# Patient Record
Sex: FEMALE | Race: WHITE
[De-identification: names, ages, dates, MRNs, and addresses within clinical notes are randomized per-mention and may not be internally consistent; named-entity substitution may affect disease eponyms.]

---

## 2018-08-13 ENCOUNTER — APPOINTMENT (OUTPATIENT)
Dept: ENDOCRINOLOGY | Facility: CLINIC | Age: 83
End: 2018-08-13

## 2024-08-14 ENCOUNTER — TRANSCRIPTION ENCOUNTER (OUTPATIENT)
Age: 89
End: 2024-08-14

## 2024-08-16 ENCOUNTER — TRANSCRIPTION ENCOUNTER (OUTPATIENT)
Age: 89
End: 2024-08-16

## 2024-10-30 ENCOUNTER — INPATIENT (INPATIENT)
Facility: HOSPITAL | Age: 89
LOS: 15 days | Discharge: HOME | DRG: 690 | End: 2024-11-15
Attending: HOSPITALIST | Admitting: HOSPITALIST
Payer: MEDICARE

## 2024-10-30 VITALS
WEIGHT: 89.95 LBS | OXYGEN SATURATION: 95 % | RESPIRATION RATE: 18 BRPM | DIASTOLIC BLOOD PRESSURE: 81 MMHG | HEIGHT: 62 IN | TEMPERATURE: 98 F | HEART RATE: 108 BPM | SYSTOLIC BLOOD PRESSURE: 130 MMHG

## 2024-10-30 LAB
ALBUMIN SERPL ELPH-MCNC: 3 G/DL — LOW (ref 3.3–5)
ALP SERPL-CCNC: 121 U/L — HIGH (ref 40–120)
ALT FLD-CCNC: 12 U/L — SIGNIFICANT CHANGE UP (ref 10–45)
ANION GAP SERPL CALC-SCNC: 8 MMOL/L — SIGNIFICANT CHANGE UP (ref 5–17)
ANISOCYTOSIS BLD QL: SLIGHT — SIGNIFICANT CHANGE UP
APPEARANCE UR: ABNORMAL
APTT BLD: 38.1 SEC — HIGH (ref 24.5–35.6)
AST SERPL-CCNC: 26 U/L — SIGNIFICANT CHANGE UP (ref 10–40)
BACTERIA # UR AUTO: ABNORMAL /HPF
BASOPHILS # BLD AUTO: 0.05 K/UL — SIGNIFICANT CHANGE UP (ref 0–0.2)
BASOPHILS NFR BLD AUTO: 1.8 % — SIGNIFICANT CHANGE UP (ref 0–2)
BILIRUB SERPL-MCNC: 0.3 MG/DL — SIGNIFICANT CHANGE UP (ref 0.2–1.2)
BILIRUB UR-MCNC: NEGATIVE — SIGNIFICANT CHANGE UP
BLD GP AB SCN SERPL QL: NEGATIVE — SIGNIFICANT CHANGE UP
BUN SERPL-MCNC: 32 MG/DL — HIGH (ref 7–23)
CALCIUM SERPL-MCNC: 8.6 MG/DL — SIGNIFICANT CHANGE UP (ref 8.4–10.5)
CHLORIDE SERPL-SCNC: 104 MMOL/L — SIGNIFICANT CHANGE UP (ref 96–108)
CO2 SERPL-SCNC: 22 MMOL/L — SIGNIFICANT CHANGE UP (ref 22–31)
COLOR SPEC: YELLOW — SIGNIFICANT CHANGE UP
CREAT SERPL-MCNC: 0.46 MG/DL — LOW (ref 0.5–1.3)
DIFF PNL FLD: ABNORMAL
EGFR: 91 ML/MIN/1.73M2 — SIGNIFICANT CHANGE UP
EOSINOPHIL # BLD AUTO: 0.08 K/UL — SIGNIFICANT CHANGE UP (ref 0–0.5)
EOSINOPHIL NFR BLD AUTO: 2.7 % — SIGNIFICANT CHANGE UP (ref 0–6)
FLUAV AG NPH QL: SIGNIFICANT CHANGE UP
FLUBV AG NPH QL: SIGNIFICANT CHANGE UP
GLUCOSE SERPL-MCNC: 84 MG/DL — SIGNIFICANT CHANGE UP (ref 70–99)
GLUCOSE UR QL: NEGATIVE MG/DL — SIGNIFICANT CHANGE UP
HCT VFR BLD CALC: 30.7 % — LOW (ref 34.5–45)
HGB BLD-MCNC: 9.9 G/DL — LOW (ref 11.5–15.5)
HYPOCHROMIA BLD QL: SLIGHT — SIGNIFICANT CHANGE UP
INR BLD: 2.59 RATIO — HIGH (ref 0.85–1.16)
KETONES UR-MCNC: NEGATIVE MG/DL — SIGNIFICANT CHANGE UP
LEUKOCYTE ESTERASE UR-ACNC: ABNORMAL
LIDOCAIN IGE QN: 47 U/L — SIGNIFICANT CHANGE UP (ref 7–60)
LYMPHOCYTES # BLD AUTO: 0.63 K/UL — LOW (ref 1–3.3)
LYMPHOCYTES # BLD AUTO: 21.2 % — SIGNIFICANT CHANGE UP (ref 13–44)
MANUAL SMEAR VERIFICATION: SIGNIFICANT CHANGE UP
MCHC RBC-ENTMCNC: 27.8 PG — SIGNIFICANT CHANGE UP (ref 27–34)
MCHC RBC-ENTMCNC: 32.2 G/DL — SIGNIFICANT CHANGE UP (ref 32–36)
MCV RBC AUTO: 86.2 FL — SIGNIFICANT CHANGE UP (ref 80–100)
MICROCYTES BLD QL: SLIGHT — SIGNIFICANT CHANGE UP
MONOCYTES # BLD AUTO: 0.32 K/UL — SIGNIFICANT CHANGE UP (ref 0–0.9)
MONOCYTES NFR BLD AUTO: 10.6 % — SIGNIFICANT CHANGE UP (ref 2–14)
NEUTROPHILS # BLD AUTO: 1.9 K/UL — SIGNIFICANT CHANGE UP (ref 1.8–7.4)
NEUTROPHILS NFR BLD AUTO: 63.7 % — SIGNIFICANT CHANGE UP (ref 43–77)
NITRITE UR-MCNC: NEGATIVE — SIGNIFICANT CHANGE UP
OVALOCYTES BLD QL SMEAR: SLIGHT — SIGNIFICANT CHANGE UP
PH UR: 7.5 — SIGNIFICANT CHANGE UP (ref 5–8)
PLAT MORPH BLD: NORMAL — SIGNIFICANT CHANGE UP
PLATELET # BLD AUTO: 329 K/UL — SIGNIFICANT CHANGE UP (ref 150–400)
POLYCHROMASIA BLD QL SMEAR: SLIGHT — SIGNIFICANT CHANGE UP
POTASSIUM SERPL-MCNC: 4.7 MMOL/L — SIGNIFICANT CHANGE UP (ref 3.5–5.3)
POTASSIUM SERPL-SCNC: 4.7 MMOL/L — SIGNIFICANT CHANGE UP (ref 3.5–5.3)
PROT SERPL-MCNC: 6.7 G/DL — SIGNIFICANT CHANGE UP (ref 6–8.3)
PROT UR-MCNC: 100 MG/DL
PROTHROM AB SERPL-ACNC: 29.2 SEC — HIGH (ref 9.9–13.4)
RBC # BLD: 3.56 M/UL — LOW (ref 3.8–5.2)
RBC # FLD: 16.4 % — HIGH (ref 10.3–14.5)
RBC BLD AUTO: ABNORMAL
RBC CASTS # UR COMP ASSIST: SIGNIFICANT CHANGE UP /HPF (ref 0–4)
RH IG SCN BLD-IMP: POSITIVE — SIGNIFICANT CHANGE UP
RSV RNA NPH QL NAA+NON-PROBE: SIGNIFICANT CHANGE UP
SARS-COV-2 RNA SPEC QL NAA+PROBE: SIGNIFICANT CHANGE UP
SODIUM SERPL-SCNC: 134 MMOL/L — LOW (ref 135–145)
SP GR SPEC: 1.01 — SIGNIFICANT CHANGE UP (ref 1–1.03)
TARGETS BLD QL SMEAR: SLIGHT — SIGNIFICANT CHANGE UP
UROBILINOGEN FLD QL: 1 MG/DL — SIGNIFICANT CHANGE UP (ref 0.2–1)
WBC # BLD: 2.99 K/UL — LOW (ref 3.8–10.5)
WBC # FLD AUTO: 2.99 K/UL — LOW (ref 3.8–10.5)
WBC UR QL: >50 /HPF — HIGH (ref 0–5)

## 2024-10-30 PROCEDURE — 71045 X-RAY EXAM CHEST 1 VIEW: CPT | Mod: 26

## 2024-10-30 PROCEDURE — 99285 EMERGENCY DEPT VISIT HI MDM: CPT | Mod: GC

## 2024-10-30 RX ORDER — SODIUM CHLORIDE 9 MG/ML
500 INJECTION, SOLUTION INTRAMUSCULAR; INTRAVENOUS; SUBCUTANEOUS ONCE
Refills: 0 | Status: COMPLETED | OUTPATIENT
Start: 2024-10-30 | End: 2024-10-30

## 2024-10-30 RX ADMIN — SODIUM CHLORIDE 500 MILLILITER(S): 9 INJECTION, SOLUTION INTRAMUSCULAR; INTRAVENOUS; SUBCUTANEOUS at 21:05

## 2024-10-30 RX ADMIN — SODIUM CHLORIDE 500 MILLILITER(S): 9 INJECTION, SOLUTION INTRAMUSCULAR; INTRAVENOUS; SUBCUTANEOUS at 18:25
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
## 2024-10-30 NOTE — ED ADULT TRIAGE NOTE - HEIGHT IN INCHES
09/14/21 1930   Provider Notification   Reason for Communication Evaluate;New orders; Review case   Provider Name Dr Cherelle Vasquez   Provider Notification Physician   Method of Communication Call   Response See orders   Notification Time 1931   called Dr Cherelle Vasquez at [id number] for admission orders and she said to release standing orders from transfer from 5th floor and to order Trazodone 50mg and Clonodine 0.1mg for elevated Bp greater that 160/100. Released admission orders and entered orders and administered medications to patient. Pt stated that his ears were ringing and pain in head, back and radiating to right foot from patient stated pinched nerve. Pain rating of 7 and admin prn Tylenol 650 mg for pain. Re-oriented patient to room and pt in da area and TV area, pt stated that he was tired and went to room. Pt was medication compliant and stated that he is tired. Pt Interm cough and increase urination. Will continue to monitor for safety. 2

## 2024-10-30 NOTE — ED ADULT NURSE REASSESSMENT NOTE - NS ED NURSE REASSESS COMMENT FT1
Pt bladder scan showed 63cc. MD ordered straight catheterization due to unsuccessful void. Pt and daughter at bedside  made aware of straight catheterization and both pt and daughter consented. Sterile technique was used accompanied by ROXY Peck. Pt bladder scan showed 63cc. Per MD Hinkle okay to straight catheterization Pt and daughter at bedside  made aware of straight catheterization and both pt and daughter consented. Sterile technique was used accompanied by ROXY Peck. 70 cc of urine drained. Pt cleaned and turned.
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
## 2024-10-30 NOTE — ED ADULT NURSE REASSESSMENT NOTE - NS ED NURSE REASSESS COMMENT FT1
Pt ordered for UA UC. pt unable to provide urine sample. straight cath ordered by MD. procedure explained to pt and pt daughter at bedside. Pt and pt daughter consent to straight cath, straight cath performed using sterile technique, with RN MD pollo DE ANDA kumichael. 0 cc urine drained, bladder scanned, 0cc seen on bladder scan. straight cath removed, MD made aware fluids ordered.

## 2024-10-30 NOTE — ED ADULT TRIAGE NOTE - CHIEF COMPLAINT QUOTE
[No studies available for review at this time.] : No studies available for review at this time. weight loss, ftt  diagnosed with vaginal mass this week
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
## 2024-10-30 NOTE — ED ADULT NURSE REASSESSMENT NOTE - NS ED NURSE REASSESS COMMENT FT1
Pt bladder scanned 28 cc seen. Md at bedside aware and can hold off on straight cath now.  more fluids are to be ordered and administer. Repeat bladder scan after fluids finish.

## 2024-10-30 NOTE — ED PROVIDER NOTE - PHYSICAL EXAMINATION
General: WN/WD NAD  Head: Atraumatic  Eyes: EOM grossly in tact, no scleral icterus  ENT: moist mucous membranes  Neurology: A&Ox3, nonfocal, MILTON x 4  Respiratory: normal respiratory effort  CV: Extremities warm and well perfused  Abdominal: Soft, non-distended  Extremities: No edema  : Shared decision making engaged with patient and family, okay with external visual inspection during catheter placement chapparoned with ARMAND Diop Only atrophic vaginitis noted, no bleeding or mass observed on external examination., no manual component performed at this time.  Skin: No rashes

## 2024-10-30 NOTE — ED PROVIDER NOTE - NSICDXPASTMEDICALHX_GEN_ALL_CORE_FT
PAST MEDICAL HISTORY:  Adult failure to thrive     Anemia of chronic disease     Benign essential HTN     Chronic atrial fibrillation     Hyperlipidemia     Severe malnutrition

## 2024-10-30 NOTE — ED ADULT NURSE NOTE - NSFALLRISKINTERV_ED_ALL_ED
Assistance OOB with selected safe patient handling equipment if applicable/Communicate fall risk and risk factors to all staff, patient, and family/Monitor gait and stability/Monitor for mental status changes and reorient to person, place, and time, as needed/Move patient closer to nursing station/within visual sight of ED staff/Provide patient with walking aids/Provide visual cue: yellow wristband, yellow gown, etc/Reinforce activity limits and safety measures with patient and family/Toileting schedule using arm’s reach rule for commode and bathroom/Use of alarms - bed, stretcher, chair and/or video monitoring/Call bell, personal items and telephone in reach/Instruct patient to call for assistance before getting out of bed/chair/stretcher/Non-slip footwear applied when patient is off stretcher/[place] to call system/Physically safe environment - no spills, clutter or unnecessary equipment/Purposeful Proactive Rounding/Room/bathroom lighting operational, light cord in reach

## 2024-10-31 DIAGNOSIS — N39.0 URINARY TRACT INFECTION, SITE NOT SPECIFIED: ICD-10-CM

## 2024-10-31 LAB
ALBUMIN SERPL ELPH-MCNC: 3 G/DL — LOW (ref 3.3–5)
ALP SERPL-CCNC: 118 U/L — SIGNIFICANT CHANGE UP (ref 40–120)
ALT FLD-CCNC: 11 U/L — SIGNIFICANT CHANGE UP (ref 10–45)
ANION GAP SERPL CALC-SCNC: 10 MMOL/L — SIGNIFICANT CHANGE UP (ref 5–17)
AST SERPL-CCNC: 17 U/L — SIGNIFICANT CHANGE UP (ref 10–40)
BILIRUB SERPL-MCNC: 0.3 MG/DL — SIGNIFICANT CHANGE UP (ref 0.2–1.2)
BUN SERPL-MCNC: 22 MG/DL — SIGNIFICANT CHANGE UP (ref 7–23)
CALCIUM SERPL-MCNC: 8.9 MG/DL — SIGNIFICANT CHANGE UP (ref 8.4–10.5)
CHLORIDE SERPL-SCNC: 101 MMOL/L — SIGNIFICANT CHANGE UP (ref 96–108)
CO2 SERPL-SCNC: 24 MMOL/L — SIGNIFICANT CHANGE UP (ref 22–31)
CREAT SERPL-MCNC: 0.4 MG/DL — LOW (ref 0.5–1.3)
EGFR: 95 ML/MIN/1.73M2 — SIGNIFICANT CHANGE UP
GLUCOSE SERPL-MCNC: 92 MG/DL — SIGNIFICANT CHANGE UP (ref 70–99)
INR BLD: 1.97 RATIO — HIGH (ref 0.85–1.16)
MAGNESIUM SERPL-MCNC: 2.1 MG/DL — SIGNIFICANT CHANGE UP (ref 1.6–2.6)
PHOSPHATE SERPL-MCNC: 3.1 MG/DL — SIGNIFICANT CHANGE UP (ref 2.5–4.5)
POTASSIUM SERPL-MCNC: 3.7 MMOL/L — SIGNIFICANT CHANGE UP (ref 3.5–5.3)
POTASSIUM SERPL-SCNC: 3.7 MMOL/L — SIGNIFICANT CHANGE UP (ref 3.5–5.3)
PROT SERPL-MCNC: 6.6 G/DL — SIGNIFICANT CHANGE UP (ref 6–8.3)
PROTHROM AB SERPL-ACNC: 22.5 SEC — HIGH (ref 9.9–13.4)
SODIUM SERPL-SCNC: 135 MMOL/L — SIGNIFICANT CHANGE UP (ref 135–145)

## 2024-10-31 PROCEDURE — 74177 CT ABD & PELVIS W/CONTRAST: CPT | Mod: 26,MC

## 2024-10-31 PROCEDURE — 70450 CT HEAD/BRAIN W/O DYE: CPT | Mod: 26,MC

## 2024-10-31 RX ORDER — LEVOTHYROXINE SODIUM 88 MCG
25 TABLET ORAL DAILY
Refills: 0 | Status: DISCONTINUED | OUTPATIENT
Start: 2024-10-31 | End: 2024-11-15

## 2024-10-31 RX ORDER — MELATONIN 5 MG
3 TABLET ORAL AT BEDTIME
Refills: 0 | Status: DISCONTINUED | OUTPATIENT
Start: 2024-10-31 | End: 2024-11-15

## 2024-10-31 RX ORDER — CEFTRIAXONE SODIUM 10 G
1000 VIAL (EA) INJECTION EVERY 24 HOURS
Refills: 0 | Status: COMPLETED | OUTPATIENT
Start: 2024-10-31 | End: 2024-11-03

## 2024-10-31 RX ORDER — OXYCODONE HYDROCHLORIDE 30 MG/1
2.5 TABLET ORAL EVERY 6 HOURS
Refills: 0 | Status: DISCONTINUED | OUTPATIENT
Start: 2024-10-31 | End: 2024-10-31

## 2024-10-31 RX ORDER — PANTOPRAZOLE SODIUM 40 MG/1
40 TABLET, DELAYED RELEASE ORAL
Refills: 0 | Status: DISCONTINUED | OUTPATIENT
Start: 2024-10-31 | End: 2024-11-15

## 2024-10-31 RX ORDER — WARFARIN SODIUM 4 MG
3 TABLET ORAL AT BEDTIME
Refills: 0 | Status: COMPLETED | OUTPATIENT
Start: 2024-10-31 | End: 2024-10-31

## 2024-10-31 RX ORDER — CEFTRIAXONE SODIUM 10 G
1000 VIAL (EA) INJECTION ONCE
Refills: 0 | Status: COMPLETED | OUTPATIENT
Start: 2024-10-31 | End: 2024-10-31

## 2024-10-31 RX ORDER — DULOXETINE HYDROCHLORIDE 30 MG/1
30 CAPSULE, DELAYED RELEASE ORAL DAILY
Refills: 0 | Status: DISCONTINUED | OUTPATIENT
Start: 2024-10-31 | End: 2024-11-15

## 2024-10-31 RX ADMIN — Medication 100 MILLIGRAM(S): at 01:35

## 2024-10-31 RX ADMIN — Medication 3 MILLIGRAM(S): at 21:51

## 2024-10-31 RX ADMIN — Medication 3 MILLIGRAM(S): at 21:50

## 2024-10-31 NOTE — PATIENT PROFILE ADULT - FUNCTIONAL ASSESSMENT - BASIC MOBILITY 6.
1-calculated by average/Not able to assess (calculate score using [facility] averaging method)

## 2024-10-31 NOTE — PATIENT PROFILE ADULT - FALL HARM RISK - RISK INTERVENTIONS
07-Jul-2021 03:21
Assistance OOB with selected safe patient handling equipment/Assistance with ambulation/Communicate Fall Risk and Risk Factors to all staff, patient, and family/Discuss with provider need for PT consult/Monitor gait and stability/Provide patient with walking aids - walker, cane, crutches/Reinforce activity limits and safety measures with patient and family/Visual Cue: Yellow wristband/Bed in lowest position, wheels locked, appropriate side rails in place/Call bell, personal items and telephone in reach/Instruct patient to call for assistance before getting out of bed or chair/Non-slip footwear when patient is out of bed/[place] to call system/Physically safe environment - no spills, clutter or unnecessary equipment/Purposeful Proactive Rounding/Room/bathroom lighting operational, light cord in reach

## 2024-10-31 NOTE — H&P ADULT - NSHPPHYSICALEXAM_GEN_ALL_CORE
PHYSICAL EXAM    Constitutional: NAD, malnourished   HEENT: PERRLA, EOMI, Normal Hearing, MMM  Neck: No LAD, No JVD  Back: Normal spine flexure, No CVA tenderness  Respiratory: CTAB/L   Cardiovascular: S1 and S2, RRR, no M/G/R  Gastrointestinal: BS+, soft, NT/ND  Extremities: No peripheral edema  Vascular: 2+ peripheral pulses  Neurological: A/O x 3, no focal deficits  Skin: No rashes PHYSICAL EXAM    Constitutional: NAD, malnourished   HEENT: PERRLA,  Neck: No LAD, No JVD  Respiratory: CTAB/L   Cardiovascular: S1 and S2, RRR, no M/G/R  Extremities: No peripheral edema  Vascular: 2+ peripheral pulses  Neurological: A/O x 3, no focal deficits  Skin: No rashes
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
## 2024-10-31 NOTE — PATIENT PROFILE ADULT - FALL HARM RISK - HARM RISK INTERVENTIONS
Assistance with ambulation/Assistance OOB with selected safe patient handling equipment/Communicate Risk of Fall with Harm to all staff/Discuss with provider need for PT consult/Monitor gait and stability/Reinforce activity limits and safety measures with patient and family/Tailored Fall Risk Interventions/Visual Cue: Yellow wristband and red socks/Bed in lowest position, wheels locked, appropriate side rails in place/Call bell, personal items and telephone in reach/Instruct patient to call for assistance before getting out of bed or chair/Non-slip footwear when patient is out of bed/[place] to call system/Physically safe environment - no spills, clutter or unnecessary equipment/Purposeful Proactive Rounding/Room/bathroom lighting operational, light cord in reach
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
## 2024-11-01 LAB
ANION GAP SERPL CALC-SCNC: 12 MMOL/L — SIGNIFICANT CHANGE UP (ref 5–17)
BUN SERPL-MCNC: 25 MG/DL — HIGH (ref 7–23)
CALCIUM SERPL-MCNC: 8.4 MG/DL — SIGNIFICANT CHANGE UP (ref 8.4–10.5)
CHLORIDE SERPL-SCNC: 103 MMOL/L — SIGNIFICANT CHANGE UP (ref 96–108)
CO2 SERPL-SCNC: 22 MMOL/L — SIGNIFICANT CHANGE UP (ref 22–31)
CREAT SERPL-MCNC: 0.55 MG/DL — SIGNIFICANT CHANGE UP (ref 0.5–1.3)
CULTURE RESULTS: SIGNIFICANT CHANGE UP
EGFR: 88 ML/MIN/1.73M2 — SIGNIFICANT CHANGE UP
GLUCOSE SERPL-MCNC: 79 MG/DL — SIGNIFICANT CHANGE UP (ref 70–99)
HCT VFR BLD CALC: 32 % — LOW (ref 34.5–45)
HGB BLD-MCNC: 10.2 G/DL — LOW (ref 11.5–15.5)
INR BLD: 1.71 RATIO — HIGH (ref 0.85–1.16)
MCHC RBC-ENTMCNC: 26.6 PG — LOW (ref 27–34)
MCHC RBC-ENTMCNC: 31.9 G/DL — LOW (ref 32–36)
MCV RBC AUTO: 83.6 FL — SIGNIFICANT CHANGE UP (ref 80–100)
NRBC # BLD: 0 /100 WBCS — SIGNIFICANT CHANGE UP (ref 0–0)
PLATELET # BLD AUTO: 378 K/UL — SIGNIFICANT CHANGE UP (ref 150–400)
POTASSIUM SERPL-MCNC: 3.6 MMOL/L — SIGNIFICANT CHANGE UP (ref 3.5–5.3)
POTASSIUM SERPL-SCNC: 3.6 MMOL/L — SIGNIFICANT CHANGE UP (ref 3.5–5.3)
PROTHROM AB SERPL-ACNC: 19.6 SEC — HIGH (ref 9.9–13.4)
RBC # BLD: 3.83 M/UL — SIGNIFICANT CHANGE UP (ref 3.8–5.2)
RBC # FLD: 16.5 % — HIGH (ref 10.3–14.5)
SODIUM SERPL-SCNC: 137 MMOL/L — SIGNIFICANT CHANGE UP (ref 135–145)
SPECIMEN SOURCE: SIGNIFICANT CHANGE UP
WBC # BLD: 3.46 K/UL — LOW (ref 3.8–10.5)
WBC # FLD AUTO: 3.46 K/UL — LOW (ref 3.8–10.5)

## 2024-11-01 RX ORDER — WARFARIN SODIUM 4 MG
3 TABLET ORAL AT BEDTIME
Refills: 0 | Status: COMPLETED | OUTPATIENT
Start: 2024-11-01 | End: 2024-11-01

## 2024-11-01 RX ADMIN — DULOXETINE HYDROCHLORIDE 30 MILLIGRAM(S): 30 CAPSULE, DELAYED RELEASE ORAL at 14:57

## 2024-11-01 RX ADMIN — Medication 25 MICROGRAM(S): at 05:41

## 2024-11-01 RX ADMIN — Medication 3 MILLIGRAM(S): at 22:48

## 2024-11-01 RX ADMIN — Medication 100 MILLIGRAM(S): at 01:45

## 2024-11-01 RX ADMIN — PANTOPRAZOLE SODIUM 40 MILLIGRAM(S): 40 TABLET, DELAYED RELEASE ORAL at 05:41

## 2024-11-01 NOTE — DIETITIAN INITIAL EVALUATION ADULT - ENERGY INTAKE
Pt reports she feels better today and therefore able to eat. Very happy with the hospital foods. RN set up tray for Pt and then she was able to feed herself. Observed breakfast tray during interview, Pt consumed ~50% and was still eating. Per RN flowsheets, Pt consuming 50-75% of meals. Pt states her daughter is involved in her care and ordered her meals for her and also brings in outside "favorites." Did not wish to elaborate or provide food preferences. Pt states she likes Ensures and confirmed she was receiving them plus the LPS (orange) at the SNF and is amenable to receiving supplements while in the hospital. Labs reviewed. Fair (50-75%)
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
## 2024-11-01 NOTE — DIETITIAN INITIAL EVALUATION ADULT - PERTINENT LABORATORY DATA
11-01    137  |  103  |  25[H]  ----------------------------<  79  3.6   |  22  |  0.55    Ca    8.4      01 Nov 2024 07:07  Phos  3.1     10-31  Mg     2.1     10-31    TPro  6.6  /  Alb  3.0[L]  /  TBili  0.3  /  DBili  x   /  AST  17  /  ALT  11  /  AlkPhos  118  10-31
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
## 2024-11-01 NOTE — DIETITIAN INITIAL EVALUATION ADULT - ORAL INTAKE PTA/DIET HISTORY
Chart reviewed, events noted. Pt reports fluctuating PO intake based on mood. Denies issues chewing/swallowing. NKFA. Per SNF paperwork, Pt was on a regular diet with thin liquids with a nutritional supplement 3x/day, LPS 3x/day, enriched cereal 3x/day, and protein pudding 3x/day. Per review of previous RD note, Pt with prolonged poor PO intake PTA for months.

## 2024-11-01 NOTE — DIETITIAN INITIAL EVALUATION ADULT - EDUCATION DIETARY MODIFICATIONS
adequate protein/calorie intake of diet and supplements/(1) partially meets; needs review/practice/verbalization

## 2024-11-01 NOTE — DIETITIAN INITIAL EVALUATION ADULT - OTHER INFO
dosing wt: 40.8 kg (10/30)  [facility] HIE wt hx: 42 kg (8/4)  reported UBW: unknown; previously reported as 54.4 kg unknown time frame; Pt states she was never "heavy"  ht/wt per SNF paperwork: 68" / 38 kg  reported ht: 67", now 65"? ; dosing ht likely incorrect

## 2024-11-01 NOTE — DIETITIAN INITIAL EVALUATION ADULT - ADD RECOMMEND
1) liberalize diet to regular given age/FTT/BMI  2) recommend ensure plus high protein 2x/day + LPS 3x/day to supplement diet  3) recommend MVI and vit C for wound healing  4) Malnutrition sticker placed, RD to follow-up as per protocol   5) Monitor PO intake, weight, labs, skin, GI status, diet

## 2024-11-01 NOTE — DIETITIAN INITIAL EVALUATION ADULT - REASON INDICATOR FOR ASSESSMENT
nutrition consults received for assessment/education; MST 2 or > (unsure wt loss and eating poorly)  BMI < 19

## 2024-11-01 NOTE — DIETITIAN INITIAL EVALUATION ADULT - PERTINENT MEDS FT
MEDICATIONS  (STANDING):  cefTRIAXone   IVPB 1000 milliGRAM(s) IV Intermittent every 24 hours  DULoxetine 30 milliGRAM(s) Oral daily  levothyroxine 25 MICROGram(s) Oral daily  melatonin 3 milliGRAM(s) Oral at bedtime  pantoprazole    Tablet 40 milliGRAM(s) Oral before breakfast    MEDICATIONS  (PRN):  oxyCODONE    IR 2.5 milliGRAM(s) Oral every 6 hours PRN Severe Pain (7 - 10)

## 2024-11-02 LAB
ANION GAP SERPL CALC-SCNC: 10 MMOL/L — SIGNIFICANT CHANGE UP (ref 5–17)
BUN SERPL-MCNC: 32 MG/DL — HIGH (ref 7–23)
CALCIUM SERPL-MCNC: 8.2 MG/DL — LOW (ref 8.4–10.5)
CHLORIDE SERPL-SCNC: 102 MMOL/L — SIGNIFICANT CHANGE UP (ref 96–108)
CO2 SERPL-SCNC: 25 MMOL/L — SIGNIFICANT CHANGE UP (ref 22–31)
CREAT SERPL-MCNC: 0.55 MG/DL — SIGNIFICANT CHANGE UP (ref 0.5–1.3)
EGFR: 88 ML/MIN/1.73M2 — SIGNIFICANT CHANGE UP
GLUCOSE SERPL-MCNC: 79 MG/DL — SIGNIFICANT CHANGE UP (ref 70–99)
HCT VFR BLD CALC: 29.2 % — LOW (ref 34.5–45)
HGB BLD-MCNC: 9.2 G/DL — LOW (ref 11.5–15.5)
INR BLD: 2.04 RATIO — HIGH (ref 0.85–1.16)
MAGNESIUM SERPL-MCNC: 2 MG/DL — SIGNIFICANT CHANGE UP (ref 1.6–2.6)
MCHC RBC-ENTMCNC: 26.5 PG — LOW (ref 27–34)
MCHC RBC-ENTMCNC: 31.5 G/DL — LOW (ref 32–36)
MCV RBC AUTO: 84.1 FL — SIGNIFICANT CHANGE UP (ref 80–100)
NRBC # BLD: 0 /100 WBCS — SIGNIFICANT CHANGE UP (ref 0–0)
PHOSPHATE SERPL-MCNC: 3.4 MG/DL — SIGNIFICANT CHANGE UP (ref 2.5–4.5)
PLATELET # BLD AUTO: 342 K/UL — SIGNIFICANT CHANGE UP (ref 150–400)
POTASSIUM SERPL-MCNC: 3.7 MMOL/L — SIGNIFICANT CHANGE UP (ref 3.5–5.3)
POTASSIUM SERPL-SCNC: 3.7 MMOL/L — SIGNIFICANT CHANGE UP (ref 3.5–5.3)
PROTHROM AB SERPL-ACNC: 23.1 SEC — HIGH (ref 9.9–13.4)
RBC # BLD: 3.47 M/UL — LOW (ref 3.8–5.2)
RBC # FLD: 16.4 % — HIGH (ref 10.3–14.5)
SODIUM SERPL-SCNC: 137 MMOL/L — SIGNIFICANT CHANGE UP (ref 135–145)
WBC # BLD: 2.99 K/UL — LOW (ref 3.8–10.5)
WBC # FLD AUTO: 2.99 K/UL — LOW (ref 3.8–10.5)

## 2024-11-02 RX ORDER — WARFARIN SODIUM 4 MG
3 TABLET ORAL AT BEDTIME
Refills: 0 | Status: DISCONTINUED | OUTPATIENT
Start: 2024-11-02 | End: 2024-11-05

## 2024-11-02 RX ADMIN — Medication 100 MILLIGRAM(S): at 00:47

## 2024-11-02 RX ADMIN — DULOXETINE HYDROCHLORIDE 30 MILLIGRAM(S): 30 CAPSULE, DELAYED RELEASE ORAL at 12:44

## 2024-11-02 RX ADMIN — Medication 25 MICROGRAM(S): at 05:22

## 2024-11-02 RX ADMIN — Medication 3 MILLIGRAM(S): at 21:41

## 2024-11-02 RX ADMIN — PANTOPRAZOLE SODIUM 40 MILLIGRAM(S): 40 TABLET, DELAYED RELEASE ORAL at 06:57

## 2024-11-02 RX ADMIN — Medication 3 MILLIGRAM(S): at 21:43

## 2024-11-03 LAB
ANION GAP SERPL CALC-SCNC: 12 MMOL/L — SIGNIFICANT CHANGE UP (ref 5–17)
BUN SERPL-MCNC: 31 MG/DL — HIGH (ref 7–23)
CALCIUM SERPL-MCNC: 8.7 MG/DL — SIGNIFICANT CHANGE UP (ref 8.4–10.5)
CHLORIDE SERPL-SCNC: 101 MMOL/L — SIGNIFICANT CHANGE UP (ref 96–108)
CO2 SERPL-SCNC: 22 MMOL/L — SIGNIFICANT CHANGE UP (ref 22–31)
CREAT SERPL-MCNC: 0.5 MG/DL — SIGNIFICANT CHANGE UP (ref 0.5–1.3)
EGFR: 90 ML/MIN/1.73M2 — SIGNIFICANT CHANGE UP
GLUCOSE SERPL-MCNC: 68 MG/DL — LOW (ref 70–99)
HCT VFR BLD CALC: 31 % — LOW (ref 34.5–45)
HGB BLD-MCNC: 9.8 G/DL — LOW (ref 11.5–15.5)
INR BLD: 1.98 RATIO — HIGH (ref 0.85–1.16)
MAGNESIUM SERPL-MCNC: 2 MG/DL — SIGNIFICANT CHANGE UP (ref 1.6–2.6)
MCHC RBC-ENTMCNC: 26.9 PG — LOW (ref 27–34)
MCHC RBC-ENTMCNC: 31.6 G/DL — LOW (ref 32–36)
MCV RBC AUTO: 85.2 FL — SIGNIFICANT CHANGE UP (ref 80–100)
NRBC # BLD: 0 /100 WBCS — SIGNIFICANT CHANGE UP (ref 0–0)
PHOSPHATE SERPL-MCNC: 3.1 MG/DL — SIGNIFICANT CHANGE UP (ref 2.5–4.5)
PLATELET # BLD AUTO: 348 K/UL — SIGNIFICANT CHANGE UP (ref 150–400)
POTASSIUM SERPL-MCNC: 4.1 MMOL/L — SIGNIFICANT CHANGE UP (ref 3.5–5.3)
POTASSIUM SERPL-SCNC: 4.1 MMOL/L — SIGNIFICANT CHANGE UP (ref 3.5–5.3)
PROTHROM AB SERPL-ACNC: 22.4 SEC — HIGH (ref 9.9–13.4)
RBC # BLD: 3.64 M/UL — LOW (ref 3.8–5.2)
RBC # FLD: 16.2 % — HIGH (ref 10.3–14.5)
SODIUM SERPL-SCNC: 135 MMOL/L — SIGNIFICANT CHANGE UP (ref 135–145)
WBC # BLD: 3.09 K/UL — LOW (ref 3.8–10.5)
WBC # FLD AUTO: 3.09 K/UL — LOW (ref 3.8–10.5)

## 2024-11-03 RX ORDER — LISINOPRIL 40 MG
5 TABLET ORAL DAILY
Refills: 0 | Status: DISCONTINUED | OUTPATIENT
Start: 2024-11-03 | End: 2024-11-15

## 2024-11-03 RX ADMIN — Medication 3 MILLIGRAM(S): at 21:55

## 2024-11-03 RX ADMIN — Medication 25 MICROGRAM(S): at 05:13

## 2024-11-03 RX ADMIN — DULOXETINE HYDROCHLORIDE 30 MILLIGRAM(S): 30 CAPSULE, DELAYED RELEASE ORAL at 09:13

## 2024-11-03 RX ADMIN — Medication 5 MILLIGRAM(S): at 09:13

## 2024-11-03 RX ADMIN — PANTOPRAZOLE SODIUM 40 MILLIGRAM(S): 40 TABLET, DELAYED RELEASE ORAL at 05:14

## 2024-11-03 RX ADMIN — Medication 100 MILLIGRAM(S): at 00:30

## 2024-11-04 LAB
INR BLD: 1.79 RATIO — HIGH (ref 0.85–1.16)
PROTHROM AB SERPL-ACNC: 20.3 SEC — HIGH (ref 9.9–13.4)

## 2024-11-04 RX ADMIN — PANTOPRAZOLE SODIUM 40 MILLIGRAM(S): 40 TABLET, DELAYED RELEASE ORAL at 06:36

## 2024-11-04 RX ADMIN — Medication 25 MICROGRAM(S): at 05:30

## 2024-11-04 RX ADMIN — DULOXETINE HYDROCHLORIDE 30 MILLIGRAM(S): 30 CAPSULE, DELAYED RELEASE ORAL at 13:17

## 2024-11-04 RX ADMIN — Medication 5 MILLIGRAM(S): at 05:30

## 2024-11-04 RX ADMIN — Medication 3 MILLIGRAM(S): at 21:04

## 2024-11-05 LAB
APTT BLD: 34.8 SEC — SIGNIFICANT CHANGE UP (ref 24.5–35.6)
INR BLD: 1.72 RATIO — HIGH (ref 0.85–1.16)
PROTHROM AB SERPL-ACNC: 19.5 SEC — HIGH (ref 9.9–13.4)

## 2024-11-05 RX ORDER — WARFARIN SODIUM 4 MG
4 TABLET ORAL ONCE
Refills: 0 | Status: COMPLETED | OUTPATIENT
Start: 2024-11-05 | End: 2024-11-05

## 2024-11-05 RX ORDER — SODIUM CHLORIDE 9 MG/ML
1000 INJECTION, SOLUTION INTRAMUSCULAR; INTRAVENOUS; SUBCUTANEOUS
Refills: 0 | Status: DISCONTINUED | OUTPATIENT
Start: 2024-11-05 | End: 2024-11-08

## 2024-11-05 RX ADMIN — SODIUM CHLORIDE 50 MILLILITER(S): 9 INJECTION, SOLUTION INTRAMUSCULAR; INTRAVENOUS; SUBCUTANEOUS at 15:57

## 2024-11-05 RX ADMIN — PANTOPRAZOLE SODIUM 40 MILLIGRAM(S): 40 TABLET, DELAYED RELEASE ORAL at 05:19

## 2024-11-05 RX ADMIN — Medication 25 MICROGRAM(S): at 05:19

## 2024-11-05 RX ADMIN — SODIUM CHLORIDE 50 MILLILITER(S): 9 INJECTION, SOLUTION INTRAMUSCULAR; INTRAVENOUS; SUBCUTANEOUS at 15:49

## 2024-11-05 RX ADMIN — Medication 4 MILLIGRAM(S): at 21:38

## 2024-11-05 RX ADMIN — Medication 5 MILLIGRAM(S): at 05:18

## 2024-11-05 RX ADMIN — Medication 3 MILLIGRAM(S): at 21:38

## 2024-11-05 RX ADMIN — DULOXETINE HYDROCHLORIDE 30 MILLIGRAM(S): 30 CAPSULE, DELAYED RELEASE ORAL at 12:15

## 2024-11-05 NOTE — PROGRESS NOTE ADULT - CONVERSATION DETAILS
Discussed with  daughter  regarding advanced care planning  for at least 30 minutes. Reviewed MOLST form and decided to keep it as is and agreed for urology consult.

## 2024-11-05 NOTE — CONSULT NOTE ADULT - ASSESSMENT
89F PMHx FTT, afib, HLD, anemia, muscle wasting and atrophy, admitted from facility. CT scan showing mild right hydronephrosis, nonobstructing bilateral renal calculi, and dependent bladder calculi within diverticula. Urology called for evaluation.    Plan:  - No urgent intervention is indicated at this time as patient is afebrile, asymptomatic and stable  - Patient with stone burden in bladder and kidney - 1.4cm renal pelvic stone could be intermittently obstructive causing right hydronephrosis  - Would elect for outpatient surgery with cystoscopy, possible right percutaneous nephrolithotomy (high risk) vs staged ureteroscopy however patient has a lot of social issues getting to appointments. Alternatively, could opt for drainage of the right kidney as a long term solution with a right NT (would require routine exchanges under sedation every 4-6 months)  - Furthermore, patient is DNR/DNI and would require GOC about an elective surgery as the patient herself said she would prefer to do nothing at this time  - Please call urology back once GOC has been complete and if patient would like to proceed with surgery   - Call urology if patients clinical status changes/spikes any fevers     Discussed with Dr. Cano  [facility]  [address] [place] [address], [address]  [place] [address] [phone]   89F PMHx FTT, afib, HLD, anemia, muscle wasting and atrophy, admitted from facility. CT scan showing mild right hydronephrosis, nonobstructing bilateral renal calculi, and dependent bladder calculi within diverticula. Urology called for evaluation.    Plan:  - No urgent intervention is indicated at this time as patient is afebrile, asymptomatic and stable  - Patient with stone burden in bladder and kidney - 1.4cm renal pelvic stone could be intermittently obstructive causing right hydronephrosis  - Would elect for outpatient surgery with cystoscopy, possible right percutaneous nephrolithotomy (high risk) vs staged ureteroscopy (multiple rounds of anesthesia) however patient has a lot of social issues getting to appointments. Alternatively, could opt for drainage of the right kidney as a long term solution with a right NT (would require routine exchanges under sedation every 4-6 months)  - Furthermore, patient is DNR/DNI and would require GOC about an elective surgery as the patient herself said she would prefer to do nothing at this time  - Please call urology back once GOC has been complete and if patient would like to proceed with surgery   - Call urology if patients clinical status changes/spikes any fevers     Discussed with Dr. Cano  [facility]  [address] [place] [address], [address]  [place] [address] [phone]

## 2024-11-05 NOTE — CONSULT NOTE ADULT - SUBJECTIVE AND OBJECTIVE BOX
HPI:  89F PMHx FTT, afib, HLD, anemia, muscle wasting and atrophy, admitted from facility. CT scan showing mild right hydronephrosis, nonobstructing bilateral renal calculi, and dependent bladder calculi within diverticula. Urology called for evaluation.  Patient seen and examined at bedside with daughter. Per daughter, pt admitted with UTI in august and with hx of OAB but does not f/u with urology outpatient.   Patient admits to intermittent dysuria. Denies fever/chills, chest pain, abdominal pain, N/V back pain.     PAST MEDICAL & SURGICAL HISTORY:  Adult failure to thrive  Chronic atrial fibrillation  Severe malnutrition  Benign essential HTN  Hyperlipidemia  Anemia of chronic disease    MEDICATIONS  (STANDING):  DULoxetine 30 milliGRAM(s) Oral daily  levothyroxine 25 MICROGram(s) Oral daily  lisinopril 5 milliGRAM(s) Oral daily  melatonin 3 milliGRAM(s) Oral at bedtime  pantoprazole    Tablet 40 milliGRAM(s) Oral before breakfast  sodium chloride 0.9%. 1000 milliLiter(s) (50 mL/Hr) IV Continuous <Continuous>  warfarin 4 milliGRAM(s) Oral once    MEDICATIONS  (PRN):  oxyCODONE    IR 2.5 milliGRAM(s) Oral every 6 hours PRN Severe Pain (7 - 10)    Allergies  No Known Allergies    REVIEW OF SYSTEMS: Pertinent positives and negatives as stated in HPI, otherwise negative    Vital signs  T(C): 36.7 (11-05-24 @ 16:10), Max: 36.7 (11-05-24 @ 16:10)  HR: 78 (11-05-24 @ 16:10)  BP: 101/66 (11-05-24 @ 16:10)  SpO2: 98% (11-05-24 @ 16:10)    Physical Exam  Gen: NAD, thin appearing   HEENT: NCAT  Pulm: nonlabored respirations   CV: appears well perfused  Abd: Soft, NT, ND   : no SP tenderness  MSK:  No CVAT  SKIN: warm, dry, no rash.    LABS:  PT/INR - ( 05 Nov 2024 07:27 )   PT: 19.5 sec;   INR: 1.72 ratio      PTT - ( 05 Nov 2024 07:27 )  PTT:34.8 sec      Urine Cx:   Blood Cx:    Radiology:  < from: CT Abdomen and Pelvis w/ Oral Cont and w/ IV Cont (10.31.24 @ 01:02) >  IMPRESSION:  Acute inflammatory changes on chronic bladderfindings as described   likely indicates cystitis superimposed on neurogenic bladder versus   sequela of chronic bladder outlet obstruction. Correlate with urinalysis    Bilateral renal collecting system and ureteral findings as described most   likely ascending tract infection given acute cystitis    Mild right hydronephrosis. Nonobstructing bilateral renal calculi.   Dependent bladder calculi within diverticula    < end of copied text >

## 2024-11-06 LAB
ANION GAP SERPL CALC-SCNC: 9 MMOL/L — SIGNIFICANT CHANGE UP (ref 5–17)
APTT BLD: 33.8 SEC — SIGNIFICANT CHANGE UP (ref 24.5–35.6)
BUN SERPL-MCNC: 23 MG/DL — SIGNIFICANT CHANGE UP (ref 7–23)
CALCIUM SERPL-MCNC: 8.4 MG/DL — SIGNIFICANT CHANGE UP (ref 8.4–10.5)
CHLORIDE SERPL-SCNC: 99 MMOL/L — SIGNIFICANT CHANGE UP (ref 96–108)
CO2 SERPL-SCNC: 23 MMOL/L — SIGNIFICANT CHANGE UP (ref 22–31)
CREAT SERPL-MCNC: 0.46 MG/DL — LOW (ref 0.5–1.3)
EGFR: 91 ML/MIN/1.73M2 — SIGNIFICANT CHANGE UP
GLUCOSE SERPL-MCNC: 70 MG/DL — SIGNIFICANT CHANGE UP (ref 70–99)
HCT VFR BLD CALC: 30.1 % — LOW (ref 34.5–45)
HGB BLD-MCNC: 9.6 G/DL — LOW (ref 11.5–15.5)
INR BLD: 1.84 RATIO — HIGH (ref 0.85–1.16)
MCHC RBC-ENTMCNC: 27 PG — SIGNIFICANT CHANGE UP (ref 27–34)
MCHC RBC-ENTMCNC: 31.9 G/DL — LOW (ref 32–36)
MCV RBC AUTO: 84.6 FL — SIGNIFICANT CHANGE UP (ref 80–100)
NRBC # BLD: 0 /100 WBCS — SIGNIFICANT CHANGE UP (ref 0–0)
PLATELET # BLD AUTO: 316 K/UL — SIGNIFICANT CHANGE UP (ref 150–400)
POTASSIUM SERPL-MCNC: 4.4 MMOL/L — SIGNIFICANT CHANGE UP (ref 3.5–5.3)
POTASSIUM SERPL-SCNC: 4.4 MMOL/L — SIGNIFICANT CHANGE UP (ref 3.5–5.3)
PREALB SERPL-MCNC: 11 MG/DL — LOW (ref 20–40)
PROTHROM AB SERPL-ACNC: 20.8 SEC — HIGH (ref 9.9–13.4)
RBC # BLD: 3.56 M/UL — LOW (ref 3.8–5.2)
RBC # FLD: 16.1 % — HIGH (ref 10.3–14.5)
SODIUM SERPL-SCNC: 131 MMOL/L — LOW (ref 135–145)
TSH SERPL-MCNC: 3.23 UIU/ML — SIGNIFICANT CHANGE UP (ref 0.27–4.2)
WBC # BLD: 3.28 K/UL — LOW (ref 3.8–10.5)
WBC # FLD AUTO: 3.28 K/UL — LOW (ref 3.8–10.5)

## 2024-11-06 RX ORDER — WARFARIN SODIUM 4 MG
4 TABLET ORAL ONCE
Refills: 0 | Status: COMPLETED | OUTPATIENT
Start: 2024-11-06 | End: 2024-11-06

## 2024-11-06 RX ADMIN — DULOXETINE HYDROCHLORIDE 30 MILLIGRAM(S): 30 CAPSULE, DELAYED RELEASE ORAL at 12:59

## 2024-11-06 RX ADMIN — PANTOPRAZOLE SODIUM 40 MILLIGRAM(S): 40 TABLET, DELAYED RELEASE ORAL at 05:29

## 2024-11-06 RX ADMIN — Medication 4 MILLIGRAM(S): at 21:16

## 2024-11-06 RX ADMIN — Medication 25 MICROGRAM(S): at 05:29

## 2024-11-06 RX ADMIN — Medication 5 MILLIGRAM(S): at 05:30

## 2024-11-06 RX ADMIN — Medication 3 MILLIGRAM(S): at 21:16

## 2024-11-06 NOTE — PHYSICAL THERAPY INITIAL EVALUATION ADULT - PERTINENT HX OF CURRENT PROBLEM, REHAB EVAL
89 yo F with PMH of  Advanced debility, adult FTT,  Muscle wasting and atrophy, chronic A-Fib, Severe protein-calorie malnutrition, Underweight (BMI < 19), HTN. HLD, Hypothyroidism, Anemia of poor PO intake, at risk of malnutrition, constipation, unsteady gait. who was found with a vaginal mass and some delusional thought which could be due to UTI was transferred to ER for further urology evaluation. She claimed in ER she was examined by a male nurse for this vaginal mass without chaperone however based on our investigation from [facility], the exam was done with patient's consent and chaperoned with one of female aid, all documented in facility notes. She was found with a vaginal mass and per her daughter request she was sent to hospital. CXR 10/30: Trace left pleural effusion or atelectasis. CTH 10/31: No acute intracranial hemorrhage, mass effect, or midline shift. CT ABDOME/PELVIS 10/31: Acute inflammatory changes on chronic bladderfindings as described likely indicates cystitis superimposed on neurogenic bladder versus sequela of chronic bladder outlet obstruction. Bilateral renal collecting system and ureteral findings as described most likely ascending tract infection given acute cystitis. Mild right hydronephrosis. Nonobstructing bilateral renal calculi. Dependent bladder calculi within diverticula
89 yo F with PMH of  Advanced debility, adult FTT,  Muscle wasting and atrophy, chronic A-Fib, Severe protein-calorie malnutrition, Underweight (BMI < 19), HTN. HLD, Hypothyroidism, Anemia of poor PO intake, at risk of malnutrition, constipation, unsteady gait. who was found with a vaginal mass and some delusional thought which could be due to UTI was transferred to ER for further urology evaluation. She claimed in ER she was examined by a male nurse for this vaginal mass without chaperone however based on our investigation from [facility], the exam was done with patient's consent and chaperoned with one of female aid, all documented in facility notes. She was found with a vaginal mass and per her daughter request she was sent to hospital. Pt confirmed she had vaginal exam by male nurse who asked for permission first and also there was chaperone during examination by female aide and she confirmed this story. XRAY CHEST (10/30): Trace left pleural effusion or atelectasis. CT HEAD (10/31): No acute intracranial hemorrhage, mass effect, or midline shift. If clinical symptoms persist or worsen, more sensitive evaluation with brain MRI may be obtained, if no contraindications exist. CT ABD and PEL (10/31): Acute inflammatory changes on chronic bladder findings as described likely indicates cystitis superimposed on neurogenic bladder versus sequela of chronic bladder outlet obstruction. Correlate with urinalysis. Bilateral renal collecting system and ureteral findings as described most   likely ascending tract infection given acute cystitis. Mild right hydronephrosis. Non obstructing bilateral renal calculi. Dependent bladder calculi within diverticula.

## 2024-11-06 NOTE — CHART NOTE - NSCHARTNOTEFT_GEN_A_CORE
Spoke with primary team.  Consult discontinued.  Please reconsult if necessary.    Sobeida Sahu, ANP-BC  Please contact me via Teams  between 6am-2pm. If not answering, please call the palliative care pager [phone]    After 2pm and on weekends, please see the contact information below:    In the event of newly developing, evolving, or worsening symptoms between 2pm and 5pm please contact the Palliative Medicine via extension [phone] for assistance.  After 5pm please contact team via pager (if the patient is at [facility] #[phone] or if the patient is at [facility] #[phone]) The Geriatric and Palliative Medicine service has coverage 24 hours a day/ 7 days a week to provide medical recommendations regarding symptom management needs via telephone

## 2024-11-06 NOTE — PHYSICAL THERAPY INITIAL EVALUATION ADULT - ADDITIONAL COMMENTS
Pt reports she lives in a house, unclear of current living status due to prolonged stay in rehab. Pt reports difficulty with maintaining the house and states her daughter will be in charge of the house moving forward. Pt reports needing assistance with all ADLs and requires a mechanical lift to transfer out of bed at the rehab center.

## 2024-11-07 DIAGNOSIS — I10 ESSENTIAL (PRIMARY) HYPERTENSION: ICD-10-CM

## 2024-11-07 DIAGNOSIS — R62.7 ADULT FAILURE TO THRIVE: ICD-10-CM

## 2024-11-07 DIAGNOSIS — E78.5 HYPERLIPIDEMIA, UNSPECIFIED: ICD-10-CM

## 2024-11-07 DIAGNOSIS — I48.20 CHRONIC ATRIAL FIBRILLATION, UNSPECIFIED: ICD-10-CM

## 2024-11-07 LAB
ANION GAP SERPL CALC-SCNC: 11 MMOL/L — SIGNIFICANT CHANGE UP (ref 5–17)
BUN SERPL-MCNC: 21 MG/DL — SIGNIFICANT CHANGE UP (ref 7–23)
CALCIUM SERPL-MCNC: 8.3 MG/DL — LOW (ref 8.4–10.5)
CHLORIDE SERPL-SCNC: 99 MMOL/L — SIGNIFICANT CHANGE UP (ref 96–108)
CO2 SERPL-SCNC: 23 MMOL/L — SIGNIFICANT CHANGE UP (ref 22–31)
CREAT SERPL-MCNC: 0.45 MG/DL — LOW (ref 0.5–1.3)
EGFR: 92 ML/MIN/1.73M2 — SIGNIFICANT CHANGE UP
GLUCOSE SERPL-MCNC: 72 MG/DL — SIGNIFICANT CHANGE UP (ref 70–99)
HCT VFR BLD CALC: 30.5 % — LOW (ref 34.5–45)
HGB BLD-MCNC: 9.7 G/DL — LOW (ref 11.5–15.5)
INR BLD: 1.76 RATIO — HIGH (ref 0.85–1.16)
MCHC RBC-ENTMCNC: 26.4 PG — LOW (ref 27–34)
MCHC RBC-ENTMCNC: 31.8 G/DL — LOW (ref 32–36)
MCV RBC AUTO: 83.1 FL — SIGNIFICANT CHANGE UP (ref 80–100)
NRBC # BLD: 0 /100 WBCS — SIGNIFICANT CHANGE UP (ref 0–0)
PLATELET # BLD AUTO: 309 K/UL — SIGNIFICANT CHANGE UP (ref 150–400)
POTASSIUM SERPL-MCNC: 4 MMOL/L — SIGNIFICANT CHANGE UP (ref 3.5–5.3)
POTASSIUM SERPL-SCNC: 4 MMOL/L — SIGNIFICANT CHANGE UP (ref 3.5–5.3)
PROTHROM AB SERPL-ACNC: 19.9 SEC — HIGH (ref 9.9–13.4)
RBC # BLD: 3.67 M/UL — LOW (ref 3.8–5.2)
RBC # FLD: 16.2 % — HIGH (ref 10.3–14.5)
SODIUM SERPL-SCNC: 133 MMOL/L — LOW (ref 135–145)
WBC # BLD: 2.76 K/UL — LOW (ref 3.8–10.5)
WBC # FLD AUTO: 2.76 K/UL — LOW (ref 3.8–10.5)

## 2024-11-07 RX ORDER — WARFARIN SODIUM 4 MG
5 TABLET ORAL ONCE
Refills: 0 | Status: COMPLETED | OUTPATIENT
Start: 2024-11-07 | End: 2024-11-07

## 2024-11-07 RX ORDER — ENOXAPARIN SODIUM 40MG/0.4ML
40 SYRINGE (ML) SUBCUTANEOUS EVERY 12 HOURS
Refills: 0 | Status: DISCONTINUED | OUTPATIENT
Start: 2024-11-07 | End: 2024-11-07

## 2024-11-07 RX ADMIN — Medication 5 MILLIGRAM(S): at 21:39

## 2024-11-07 RX ADMIN — Medication 3 MILLIGRAM(S): at 21:39

## 2024-11-07 RX ADMIN — Medication 5 MILLIGRAM(S): at 05:19

## 2024-11-07 RX ADMIN — Medication 25 MICROGRAM(S): at 05:18

## 2024-11-07 RX ADMIN — PANTOPRAZOLE SODIUM 40 MILLIGRAM(S): 40 TABLET, DELAYED RELEASE ORAL at 05:19

## 2024-11-07 NOTE — CONSULT NOTE ADULT - ASSESSMENT
89 yo F with PMH of  Advanced debility, adult FTT,  Muscle wasting and atrophy, chronic A-Fib, Severe protein-calorie malnutrition, Underweight (BMI < 19), HTN. HLD, Hypothyroidism, Anemia of poor PO intake, at risk of malnutrition, constipation, unsteady gait. who was found with a vaginal mass and some delusional thought which could be due to UTI was transferred to ER for further urology evaluation.     Her coumadin was briefly stopped for 3 days for preparation of a urological procedure. However, when it was resumed, it was difficult to achieve therapeutic INR>

## 2024-11-07 NOTE — CONSULT NOTE ADULT - SUBJECTIVE AND OBJECTIVE BOX
HPI:  89 yo F with PMH of  Advanced debility, adult FTT,  Muscle wasting and atrophy, chronic A-Fib, Severe protein-calorie malnutrition, Underweight (BMI < 19), HTN. HLD, Hypothyroidism, Anemia of poor PO intake, at risk of malnutrition, constipation, unsteady gait. who was found with a vaginal mass and some delusional thought which could be due to UTI was transferred to ER for further urology evaluation. She claimed in ER she was examined by a male nurse for this vaginal mass without chaperone however based on our investigation from [facility], the exam was done with patient's consent and chaperoned with one of female aid, all documented in facility notes. She was found with a vaginal mass and per her daughter request she was sent to hospital.     I spoke with the patient as well myself and she confirmed she had vaginal exam by male nurse who asked for permission first and also there was chaperone during examination by female aide and she  confirmed this story, I saw her today with charge nurse of the floor in [address]. She refused any genital examination   (31 Oct 2024 07:58)    REVIEW OF SYSTEMS:    CONSTITUTIONAL: No weakness, fevers or chills, weight loss  EYES/ENT: No visual changes, no throat pain   RESPIRATORY: No cough, wheezing, hemoptysis; No shortness of breath  CARDIOVASCULAR: No chest pain or palpitations  GASTROINTESTINAL: No abdominal, nausea, vomiting, or hematemesis; No diarrhea or constipation. No melena or hematochezia.  GENITOURINARY: No dysuria, frequency or hematuria  NEUROLOGICAL: No dizziness, numbness, or weakness  SKIN: No itching, burning, rashes, or lesions   All other review of systems is negative unless indicated above.    VITAL SIGNS:        PHYSICAL EXAM:     GENERAL: no acute distress  HEENT: NC/AT, EOMI, neck supple, MMM  RESPIRATORY: LCTAB/L, no rhonchi, rales, or wheezing  CARDIOVASCULAR: RRR, no murmurs, gallops, rubs  ABDOMINAL: soft, non-tender, non-distended, positive bowel sounds   EXTREMITIES: no clubbing, cyanosis, or edema  NEUROLOGICAL: alert and oriented x 3, non-focal  LYMPHATIC: lymphatic: cervical, supraclavicular, axilla, inguinal  SKIN: no rashes or lesions   MUSCULOSKELETAL: no gross joint deformity                          9.7    2.76  )-----------( 309      ( 07 Nov 2024 07:41 )             30.5     11-07    133[L]  |  99  |  21  ----------------------------<  72  4.0   |  23  |  0.45[L]    Ca    8.3[L]      07 Nov 2024 07:41        MEDICATIONS  (STANDING):  DULoxetine 30 milliGRAM(s) Oral daily  enoxaparin Injectable 40 milliGRAM(s) SubCutaneous every 12 hours  levothyroxine 25 MICROGram(s) Oral daily  lisinopril 5 milliGRAM(s) Oral daily  melatonin 3 milliGRAM(s) Oral at bedtime  pantoprazole    Tablet 40 milliGRAM(s) Oral before breakfast  sodium chloride 0.9%. 1000 milliLiter(s) (50 mL/Hr) IV Continuous <Continuous>  warfarin 5 milliGRAM(s) Oral once       HPI:  88 yo F with PMH of  Advanced debility, adult FTT,  Muscle wasting and atrophy, chronic A-Fib, Severe protein-calorie malnutrition, Underweight (BMI < 19), HTN. HLD, Hypothyroidism, Anemia of poor PO intake, at risk of malnutrition, constipation, unsteady gait. who was found with a vaginal mass and some delusional thought which could be due to UTI was transferred to ER for further urology evaluation. She claimed in ER she was examined by a male nurse for this vaginal mass without chaperone however based on our investigation from [facility], the exam was done with patient's consent and chaperoned with one of female aid, all documented in facility notes. She was found with a vaginal mass and per her daughter request she was sent to hospital.     Her coumadin was briefly stopped for 3 days for preparation of a urological procedure. However, when it was resumed, it was difficult to achieve therapeutic INR>      REVIEW OF SYSTEMS:    CONSTITUTIONAL: some weakness, no fevers or chills, weight loss  EYES/ENT: No visual changes, no throat pain   RESPIRATORY: No cough, wheezing, hemoptysis; No shortness of breath  CARDIOVASCULAR: No chest pain or palpitations  GASTROINTESTINAL: No abdominal, nausea, vomiting, or hematemesis; No diarrhea or constipation. No melena or hematochezia.  GENITOURINARY: No dysuria, frequency or hematuria  NEUROLOGICAL: No dizziness, numbness, or weakness  SKIN: No itching, burning, rashes, or lesions       Allergies and Intolerances:        Allergies:  	No Known Allergies:     Home Medications:   * Patient Currently Takes Medications as of 16-Aug-2024 11:00 documented in Structured Notes  · 	warfarin 3 mg oral tablet: 1 tab(s) orally once a day (at bedtime) Continue to Monitor INR (Goal 2-3)  	INR 8/16/24: 1.89  · 	Narcan 4 mg/0.1 mL nasal spray: 1 spray(s) intranasally prn use in the event of suspected opioid overdose  · 	pantoprazole 40 mg oral delayed release tablet: 1 tab(s) orally once a day (before a meal)  · 	ALPRAZolam 0.25 mg oral tablet: 1 tab(s) orally every 8 hours as needed for anxiety TAKE ONE TABLET EVERY 8 HOURS AS NEEDED FOR ANXIETY AND AT BEDTIME  · 	DULoxetine 30 mg oral delayed release capsule: 1 cap(s) orally once a day  · 	oxyCODONE 5 mg/5 mL oral solution: 2.5 milliliter(s) orally every 6 hours as needed for Moderate Pain (4 - 6) TAKE 2.5 MILLILITERS EVERY SIX HOURS AS NEEDED FOR MODERATE PAIN  · 	acetaminophen 325 mg oral tablet: 2 tab(s) orally every 6 hours  · 	melatonin 3 mg oral tablet: 1 tab(s) orally once a day (at bedtime)  · 	ascorbic acid 500 mg oral tablet: 1 tab(s) orally once a day  · 	Multiple Vitamins oral tablet: 1 tab(s) orally once a day  · 	levothyroxine 25 mcg (0.025 mg) oral tablet: 1 tab(s) orally once a day  · 	senna leaf extract oral tablet: 2 tab(s) orally once a day (at bedtime)  · 	polyethylene glycol 3350 oral powder for reconstitution: 17 gram(s) orally 2 times a day  · 	lidocaine 4% topical film: Apply topically to affected area once a day    .      PAST MEDICAL HISTORY:  Adult failure to thrive     Anemia of chronic disease     Benign essential HTN     Chronic atrial fibrillation     Hyperlipidemia     Severe malnutrition.     Social History:  · Substance use	No  · Social History (marital status, living situation, occupation, and sexual history)	Alochol: Denied  Smoking: Nonsmoker  Drug Use: Denied  Marital Status:      VITAL SIGNS:    T98.7 P80 /63 RR18    PHYSICAL EXAM:     GENERAL: no acute distress,thin,  HEENT: NC/AT, EOMI, neck supple, MMM  RESPIRATORY: LCTAB/L, no rhonchi, rales, or wheezing  CARDIOVASCULAR: RRR, no murmurs, gallops, rubs  ABDOMINAL: soft, non-tender, non-distended, positive bowel sounds   EXTREMITIES: no clubbing, cyanosis, or edema  NEUROLOGICAL: alert and oriented x 3, non-focal  LYMPHATIC: lymphatic: cervical, supraclavicular, axilla, inguinal  SKIN: no rashes or lesions   MUSCULOSKELETAL: muscle wasting                          9.7    2.76  )-----------( 309      ( 07 Nov 2024 07:41 )             30.5     11-07    133[L]  |  99  |  21  ----------------------------<  72  4.0   |  23  |  0.45[L]    Ca    8.3[L]      07 Nov 2024 07:41        MEDICATIONS  (STANDING):  DULoxetine 30 milliGRAM(s) Oral daily  enoxaparin Injectable 40 milliGRAM(s) SubCutaneous every 12 hours  levothyroxine 25 MICROGram(s) Oral daily  lisinopril 5 milliGRAM(s) Oral daily  melatonin 3 milliGRAM(s) Oral at bedtime  pantoprazole    Tablet 40 milliGRAM(s) Oral before breakfast  sodium chloride 0.9%. 1000 milliLiter(s) (50 mL/Hr) IV Continuous <Continuous>  warfarin 5 milliGRAM(s) Oral once

## 2024-11-07 NOTE — CONSULT NOTE ADULT - PROBLEM SELECTOR RECOMMENDATION 9
continue to monitor INR  restrict or control  the amount green vegetable (rich in vitamin K) can eat per day  coumadin 5 mg po tonight

## 2024-11-08 LAB
INR BLD: 2.08 RATIO — HIGH (ref 0.85–1.16)
PROTHROM AB SERPL-ACNC: 23.5 SEC — HIGH (ref 9.9–13.4)

## 2024-11-08 RX ORDER — WARFARIN SODIUM 4 MG
4 TABLET ORAL ONCE
Refills: 0 | Status: COMPLETED | OUTPATIENT
Start: 2024-11-08 | End: 2024-11-08

## 2024-11-08 RX ADMIN — Medication 25 MICROGRAM(S): at 05:17

## 2024-11-08 RX ADMIN — Medication 5 MILLIGRAM(S): at 05:17

## 2024-11-08 RX ADMIN — DULOXETINE HYDROCHLORIDE 30 MILLIGRAM(S): 30 CAPSULE, DELAYED RELEASE ORAL at 11:47

## 2024-11-08 RX ADMIN — Medication 3 MILLIGRAM(S): at 21:07

## 2024-11-08 RX ADMIN — Medication 4 MILLIGRAM(S): at 21:07

## 2024-11-08 RX ADMIN — PANTOPRAZOLE SODIUM 40 MILLIGRAM(S): 40 TABLET, DELAYED RELEASE ORAL at 05:17

## 2024-11-08 NOTE — PROGRESS NOTE PEDS - SUBJECTIVE AND OBJECTIVE BOX
No complaints  Achieved therapeutic INR    REVIEW OF SYSTEMS:    CONSTITUTIONAL: No weakness, fevers or chills, weight loss  EYES/ENT: No visual changes, no throat pain   RESPIRATORY: No cough, wheezing, hemoptysis; No shortness of breath  CARDIOVASCULAR: No chest pain or palpitations  GASTROINTESTINAL: No abdominal, nausea, vomiting, or hematemesis; No diarrhea or constipation. No melena or hematochezia.      VITAL SIGNS:    T97.8    PHYSICAL EXAM:     GENERAL: no acute distress  HEENT: NC/AT, EOMI, neck supple, MMM  RESPIRATORY: LCTAB/L, no rhonchi, rales, or wheezing  CARDIOVASCULAR: RRR, no murmurs, gallops, rubs  ABDOMINAL: soft, non-tender, non-distended, positive bowel sounds   EXTREMITIES: no clubbing, cyanosis, or edema  NEUROLOGICAL: alert and oriented x 3, non-focal                        9.7    2.76  )-----------( 309      ( 07 Nov 2024 07:41 )             30.5     11-07    133[L]  |  99  |  21  ----------------------------<  72  4.0   |  23  |  0.45[L]    Ca    8.3[L]      07 Nov 2024 07:41        MEDICATIONS  (STANDING):  DULoxetine 30 milliGRAM(s) Oral daily  levothyroxine 25 MICROGram(s) Oral daily  lisinopril 5 milliGRAM(s) Oral daily  melatonin 3 milliGRAM(s) Oral at bedtime  pantoprazole    Tablet 40 milliGRAM(s) Oral before breakfast  warfarin 4 milliGRAM(s) Oral once

## 2024-11-09 LAB
INR BLD: 2.17 RATIO — HIGH (ref 0.85–1.16)
PROTHROM AB SERPL-ACNC: 24.5 SEC — HIGH (ref 9.9–13.4)

## 2024-11-09 RX ORDER — WARFARIN SODIUM 4 MG
5 TABLET ORAL ONCE
Refills: 0 | Status: COMPLETED | OUTPATIENT
Start: 2024-11-09 | End: 2024-11-09

## 2024-11-09 RX ADMIN — Medication 25 MICROGRAM(S): at 04:58

## 2024-11-09 RX ADMIN — Medication 5 MILLIGRAM(S): at 04:58

## 2024-11-09 RX ADMIN — DULOXETINE HYDROCHLORIDE 30 MILLIGRAM(S): 30 CAPSULE, DELAYED RELEASE ORAL at 11:08

## 2024-11-09 RX ADMIN — Medication 3 MILLIGRAM(S): at 21:58

## 2024-11-09 RX ADMIN — Medication 5 MILLIGRAM(S): at 21:58

## 2024-11-09 RX ADMIN — PANTOPRAZOLE SODIUM 40 MILLIGRAM(S): 40 TABLET, DELAYED RELEASE ORAL at 04:58

## 2024-11-10 LAB
INR BLD: 2.55 RATIO — HIGH (ref 0.85–1.16)
PROTHROM AB SERPL-ACNC: 28.8 SEC — HIGH (ref 9.9–13.4)

## 2024-11-10 RX ORDER — WARFARIN SODIUM 4 MG
4 TABLET ORAL ONCE
Refills: 0 | Status: COMPLETED | OUTPATIENT
Start: 2024-11-10 | End: 2024-11-10

## 2024-11-10 RX ADMIN — Medication 3 MILLIGRAM(S): at 21:32

## 2024-11-10 RX ADMIN — PANTOPRAZOLE SODIUM 40 MILLIGRAM(S): 40 TABLET, DELAYED RELEASE ORAL at 05:08

## 2024-11-10 RX ADMIN — Medication 5 MILLIGRAM(S): at 05:07

## 2024-11-10 RX ADMIN — Medication 4 MILLIGRAM(S): at 21:33

## 2024-11-10 RX ADMIN — Medication 25 MICROGRAM(S): at 05:07

## 2024-11-10 RX ADMIN — DULOXETINE HYDROCHLORIDE 30 MILLIGRAM(S): 30 CAPSULE, DELAYED RELEASE ORAL at 11:48

## 2024-11-11 ENCOUNTER — TRANSCRIPTION ENCOUNTER (OUTPATIENT)
Age: 89
End: 2024-11-11

## 2024-11-11 LAB
INR BLD: 2.79 RATIO — HIGH (ref 0.85–1.16)
PROTHROM AB SERPL-ACNC: 31.8 SEC — HIGH (ref 9.9–13.4)

## 2024-11-11 RX ORDER — SENNA 187 MG
2 TABLET ORAL
Qty: 0 | Refills: 0 | DISCHARGE
Start: 2024-11-11

## 2024-11-11 RX ORDER — POLYETHYLENE GLYCOL 3350 17 G/17G
17 POWDER, FOR SOLUTION ORAL DAILY
Refills: 0 | Status: DISCONTINUED | OUTPATIENT
Start: 2024-11-11 | End: 2024-11-12

## 2024-11-11 RX ORDER — LEVOTHYROXINE SODIUM 88 MCG
1 TABLET ORAL
Qty: 0 | Refills: 0 | DISCHARGE
Start: 2024-11-11

## 2024-11-11 RX ORDER — MELATONIN 5 MG
1 TABLET ORAL
Qty: 0 | Refills: 0 | DISCHARGE
Start: 2024-11-11

## 2024-11-11 RX ORDER — LISINOPRIL 40 MG
1 TABLET ORAL
Qty: 0 | Refills: 0 | DISCHARGE
Start: 2024-11-11

## 2024-11-11 RX ORDER — WARFARIN SODIUM 4 MG
1 TABLET ORAL
Qty: 1 | Refills: 0
Start: 2024-11-11 | End: 2024-11-11

## 2024-11-11 RX ORDER — DULOXETINE HYDROCHLORIDE 30 MG/1
1 CAPSULE, DELAYED RELEASE ORAL
Qty: 0 | Refills: 0 | DISCHARGE
Start: 2024-11-11

## 2024-11-11 RX ORDER — SENNA 187 MG
2 TABLET ORAL AT BEDTIME
Refills: 0 | Status: DISCONTINUED | OUTPATIENT
Start: 2024-11-11 | End: 2024-11-15

## 2024-11-11 RX ORDER — PANTOPRAZOLE SODIUM 40 MG/1
1 TABLET, DELAYED RELEASE ORAL
Qty: 0 | Refills: 0 | DISCHARGE
Start: 2024-11-11

## 2024-11-11 RX ADMIN — Medication 3 MILLIGRAM(S): at 22:41

## 2024-11-11 RX ADMIN — DULOXETINE HYDROCHLORIDE 30 MILLIGRAM(S): 30 CAPSULE, DELAYED RELEASE ORAL at 11:28

## 2024-11-11 RX ADMIN — Medication 2 TABLET(S): at 02:55

## 2024-11-11 RX ADMIN — Medication 25 MICROGRAM(S): at 05:33

## 2024-11-11 RX ADMIN — PANTOPRAZOLE SODIUM 40 MILLIGRAM(S): 40 TABLET, DELAYED RELEASE ORAL at 05:34

## 2024-11-11 RX ADMIN — Medication 5 MILLIGRAM(S): at 05:34

## 2024-11-11 NOTE — DISCHARGE NOTE PROVIDER - NSDCFUADDAPPT_GEN_ALL_CORE_FT
APPTS ARE READY TO BE MADE: [x ] YES    Best Family or Patient Contact (if needed):    Additional Information about above appointments (if needed):    1: pcp  2: urology  3:     Other comments or requests:    APPTS ARE READY TO BE MADE: [x ] YES    Best Family or Patient Contact (if needed):    Additional Information about above appointments (if needed):    1: pcp  2: urology  3:     Other comments or requests:     Patient was outreached but did not answer. A voicemail was left for the patient to return our call.

## 2024-11-11 NOTE — DISCHARGE NOTE PROVIDER - NSDCCPCAREPLAN_GEN_ALL_CORE_FT
PRINCIPAL DISCHARGE DIAGNOSIS  Diagnosis: Urinary tract infection  Assessment and Plan of Treatment: You completed IV antibiotics for UTI. please f/u with urology      SECONDARY DISCHARGE DIAGNOSES  Diagnosis: Vaginal mass  Assessment and Plan of Treatment: Would elect for outpatient surgery with cystoscopy, possible right percutaneous nephrolithotomy (high risk) vs staged ureteroscopy (multiple rounds of anesthesia). Follow up with urology        PRINCIPAL DISCHARGE DIAGNOSIS  Diagnosis: Urinary tract infection  Assessment and Plan of Treatment: You completed IV antibiotics for UTI.   Please seek medical care if symptoms return, develop fever, pain on urination, etc.      SECONDARY DISCHARGE DIAGNOSES  Diagnosis: Vaginal mass  Assessment and Plan of Treatment: Would elect for outpatient surgery with cystoscopy, possible right percutaneous nephrolithotomy (high risk) vs staged ureteroscopy (multiple rounds of anesthesia). Follow up with urology to discuss plan and options

## 2024-11-11 NOTE — DISCHARGE NOTE PROVIDER - HOSPITAL COURSE
HPI:  89 yo F with PMH of  Advanced debility, adult FTT,  Muscle wasting and atrophy, chronic A-Fib, Severe protein-calorie malnutrition, Underweight (BMI < 19), HTN. HLD, Hypothyroidism, Anemia of poor PO intake, at risk of malnutrition, constipation, unsteady gait. who was found with a vaginal mass and some delusional thought which could be due to UTI was transferred to ER for further urology evaluation. She claimed in ER she was examined by a male nurse for this vaginal mass without chaperone however based on our investigation from [facility], the exam was done with patient's consent and chaperoned with one of female aid, all documented in facility notes. She was found with a vaginal mass and per her daughter request she was sent to hospital.     I spoke with the patient as well myself and she confirmed she had vaginal exam by male nurse who asked for permission first and also there was chaperone during examination by female aide and she  confirmed this story, I saw her today with charge nurse of the floor in [facility]. She refused any genital examination   (31 Oct 2024 07:58)    Hospital Course:  Patient was admitted for UTI severe cystitis , UC contaminated treat with 5 days IV ceftriaxone. Vaginal mass -Daughter agreed for urology consult.  Bladder wall thickening -possible malignancy with a lot of diverticulum and stone in bladder. Evaluated by urology who recommended f/u office as outpatient.   AFIB - Coumadin. INR therapeutic. Hypothyroid - on Levothroid 25 mcg, monitor TSH. Depression - on Duloxetine    Important Medication Changes and Reason:  lisinopril dose increase  Active or Pending Issues Requiring Follow-up:  Vaginal mass - f/u urology consult outpatient     Advanced Directives:   [x ] Full code  [ ] DNR  [ ] Hospice    Discharge Diagnoses:  UTI  AFIB   VAGINAL MASS  DEPRESSION  Hypothyroid

## 2024-11-11 NOTE — DISCHARGE NOTE PROVIDER - NSDCMRMEDTOKEN_GEN_ALL_CORE_FT
acetaminophen 325 mg oral tablet: 2 tab(s) orally every 6 hours  ascorbic acid 500 mg oral tablet: 1 tab(s) orally once a day  DULoxetine 30 mg oral delayed release capsule: 1 cap(s) orally once a day  levothyroxine 25 mcg (0.025 mg) oral tablet: 1 tab(s) orally once a day  lisinopril 5 mg oral tablet: 1 tab(s) orally once a day  melatonin 3 mg oral tablet: 1 tab(s) orally once a day (at bedtime)  Multiple Vitamins oral tablet: 1 tab(s) orally once a day  pantoprazole 40 mg oral delayed release tablet: 1 tab(s) orally once a day (before a meal)  polyethylene glycol 3350 oral powder for reconstitution: 17 gram(s) orally 2 times a day  senna leaf extract oral tablet: 2 tab(s) orally once a day (at bedtime) As needed Constipation  warfarin 3 mg oral tablet: 1 tab(s) orally once a day (at bedtime) Continue to Monitor INR (Goal 2-3)   acetaminophen 325 mg oral tablet: 2 tab(s) orally every 6 hours  ascorbic acid 500 mg oral tablet: 1 tab(s) orally once a day  DULoxetine 30 mg oral delayed release capsule: 1 cap(s) orally once a day  levothyroxine 25 mcg (0.025 mg) oral tablet: 1 tab(s) orally once a day  lidocaine 4% topical film: Apply topically to affected area once a day as needed for  back pain  lisinopril 5 mg oral tablet: 1 tab(s) orally once a day  melatonin 3 mg oral tablet: 1 tab(s) orally once a day (at bedtime)  Multiple Vitamins oral tablet: 1 tab(s) orally once a day  pantoprazole 40 mg oral delayed release tablet: 1 tab(s) orally once a day (before a meal)  polyethylene glycol 3350 oral powder for reconstitution: 17 gram(s) orally 2 times a day  senna leaf extract oral tablet: 2 tab(s) orally once a day (at bedtime) as needed for Constipation  warfarin 4 mg oral tablet: 1 tab(s) orally once a day (at bedtime)

## 2024-11-11 NOTE — DISCHARGE NOTE PROVIDER - PROVIDER TOKENS
PROVIDER:[TOKEN:[[id number]],FOLLOWUP:[2 weeks]] PROVIDER:[TOKEN:[[id number]],FOLLOWUP:[2 weeks]],PROVIDER:[TOKEN:[[id number]],FOLLOWUP:[2 weeks]]

## 2024-11-11 NOTE — DISCHARGE NOTE PROVIDER - DETAILS OF MALNUTRITION DIAGNOSIS/DIAGNOSES
This patient has been assessed with a concern for Malnutrition and was treated during this hospitalization for the following Nutrition diagnosis/diagnoses:     -  11/01/2024: Severe protein-calorie malnutrition   -  11/01/2024: Underweight (BMI < 19)

## 2024-11-11 NOTE — DISCHARGE NOTE PROVIDER - CARE PROVIDER_API CALL
Chandrika Batres  Internal Medicine  [address] [place] [id number]  Phone: [phone]  Fax: [phone]  Follow Up Time: 2 weeks   Chandrika Batres  Internal Medicine  [address] [place] [address]  Phone: [phone]  Fax: [phone]  Follow Up Time: 2 weeks    Zay Cano  Urology  [address] [place] [id number]  Phone: [phone]  Fax: [phone]  Follow Up Time: 2 weeks

## 2024-11-11 NOTE — DISCHARGE NOTE PROVIDER - CARE PROVIDERS DIRECT ADDRESSES
,DirectAddress_Unknown ,DirectAddress_Unknown,[email or web address]

## 2024-11-12 LAB
INR BLD: 2.58 RATIO — HIGH (ref 0.85–1.16)
PROTHROM AB SERPL-ACNC: 29.4 SEC — HIGH (ref 9.9–13.4)

## 2024-11-12 RX ORDER — WARFARIN SODIUM 4 MG
4 TABLET ORAL ONCE
Refills: 0 | Status: COMPLETED | OUTPATIENT
Start: 2024-11-12 | End: 2024-11-12

## 2024-11-12 RX ORDER — TUBERCULIN,PURIF.PROT.DERIV. 5 T/0.1 ML
5 VIAL (ML) INTRADERMAL ONCE
Refills: 0 | Status: COMPLETED | OUTPATIENT
Start: 2024-11-12 | End: 2024-11-12

## 2024-11-12 RX ADMIN — Medication 25 MICROGRAM(S): at 05:43

## 2024-11-12 RX ADMIN — Medication 4 MILLIGRAM(S): at 22:51

## 2024-11-12 RX ADMIN — Medication 3 MILLIGRAM(S): at 22:51

## 2024-11-12 RX ADMIN — Medication 5 MILLIGRAM(S): at 05:43

## 2024-11-12 RX ADMIN — DULOXETINE HYDROCHLORIDE 30 MILLIGRAM(S): 30 CAPSULE, DELAYED RELEASE ORAL at 14:13

## 2024-11-12 RX ADMIN — PANTOPRAZOLE SODIUM 40 MILLIGRAM(S): 40 TABLET, DELAYED RELEASE ORAL at 05:42

## 2024-11-12 RX ADMIN — Medication 5 UNIT(S): at 17:06

## 2024-11-12 NOTE — CHART NOTE - NSCHARTNOTEFT_GEN_A_CORE
NUTRITION FOLLOW UP NOTE    PATIENT SEEN FOR: Malnutrition Follow Up    SOURCE: [X] Patient  [x] Current Medical Record  [X] RN  [] Family/support person at bedside  [] Patient unavailable/inappropriate  [] Other:    CHART REVIEWED/EVENTS NOTED.  [X] No changes to nutrition care plan to note  [] Nutrition Status:      DIET ORDER:   Diet, DASH/TLC:   Sodium & Cholesterol Restricted (10-31-24)      CURRENT DIET ORDER IS:  [] Appropriate:  [X] Inadequate:  [] Other:    NUTRITION INTAKE/PROVISION:  [X] PO:  - Reports consuming <50% of meals secondary to portions too large  - Amenable to having Ensure Plus High Protein (Provides 20g PRO, 350 Gentry per serving) and ProSource Gelatein Plus (per serving provides 20 g PRO, 160 gentry) to optimize protein/caloric intake in-between meals.   - Noted pt previously amenable to Liquid Protein Supplementation (per serving provides 15 g PRO, 100 kcal), however, dislikes flavors available in-house.   [] Enteral Nutrition:  [] Parenteral Nutrition:    ANTHROPOMETRICS:  Drug Dosing Weight  Height (cm): 157.5 (30 Oct 2024 16:52)  Weight (kg): 40.8 (30 Oct 2024 16:52)  BMI (kg/m2): 16.4 (30 Oct 2024 16:52)    Weights:   Daily Weight in k.1 ( - RD obtained [facility]), 40.1 ()   Suspected wt loss of 5% x ~ 1 week (based on wts from  and ) likely iso inadequate PO intake  RD will continue to monitor wt trends as available/able    NUTRITIONALLY PERTINENT MEDICATIONS:  MEDICATIONS  (STANDING):  levothyroxine  lisinopril  pantoprazole    Tablet       NUTRITIONALLY PERTINENT LABS:    PAB 11 mg/dL[L]      NUTRITIONALLY PERTINENT MEDICATIONS/LABS:  [x] Reviewed  [X] Relevant notes on medications/labs:  - PO synthroid.     EDEMA:  [x] Reviewed  [] Relevant notes:    GI/ I&O:  [x] Reviewed  [X] Relevant notes: Previously with constipation, now resolved; last BM ; ordered for Miralax, senna.   [X] Other: Ordered for PPI    SKIN:   [] No pressure injuries documented, per nursing flowsheet  [X] Pressure injury previously noted: sacrum St 1 pressure injury per RN flowsheets    [] Change in pressure injury documentation:  [] Other:    ESTIMATED NEEDS:  [X] No change:  [] Updated:  Energy: 1428 - 1632 kcal/day (35 - 40 kcal/kg)  Protein: 53 - 65 g/day (1.3 - 1.6 g/kg)  Fluid:   ml/day or [x] defer to team  Based on: dosing wt of 40.8 kg in consideration of BMI <19, Malnutrition    NUTRITION DIAGNOSIS:  [X] Prior Dx: (1) Severe Malnutrition in context of social/ environmental circumstances (2) Underweight (3) Increased Nutrient Needs  [] New Dx:    EDUCATION:  [X] Yes: Educated on the importance of consuming a diet adequate in protein rich food sources; encouraged prioritizing protein foods first at meal times; recommended small, frequent nutrient dense meals. Discussed the benefits of oral nutrition supplementation; recommended having small sips in between meals to optimize protein intake. Pt receptive to diet education and made aware RD remains available upon request.   [] Not appropriate/warranted    NUTRITION CARE PLAN:  [X] Consider liberalizing diet to regular to optimize PO intake; defer diet texture/consistency to speech language pathologist prn  [X] Add Ensure Plus High Protein (Provides 20g PRO, 350 Gentry per serving) 2x/d and ProSource Gelatein Plus (per serving provides 20 g PRO, 160 gentry) 1x/d to aid in protein/caloric intake in-between meals  [X] Add multivitamin and vitamin C once daily for micronutrient coverage/wound healing as medically feasible    [] Achieved - Continue current nutrition intervention(s)  [] Current medical condition precludes nutrition intervention at this time.    MONITORING AND EVALUATION:   RD remains available upon request and will follow up per protocol.    Shabana Gonzalez RD  Available on [facility] TEAMS

## 2024-11-13 LAB
ANION GAP SERPL CALC-SCNC: 11 MMOL/L — SIGNIFICANT CHANGE UP (ref 5–17)
BASOPHILS # BLD AUTO: 0 K/UL — SIGNIFICANT CHANGE UP (ref 0–0.2)
BASOPHILS NFR BLD AUTO: 0 % — SIGNIFICANT CHANGE UP (ref 0–2)
BUN SERPL-MCNC: 22 MG/DL — SIGNIFICANT CHANGE UP (ref 7–23)
CALCIUM SERPL-MCNC: 8.9 MG/DL — SIGNIFICANT CHANGE UP (ref 8.4–10.5)
CHLORIDE SERPL-SCNC: 97 MMOL/L — SIGNIFICANT CHANGE UP (ref 96–108)
CO2 SERPL-SCNC: 23 MMOL/L — SIGNIFICANT CHANGE UP (ref 22–31)
CREAT SERPL-MCNC: 0.47 MG/DL — LOW (ref 0.5–1.3)
EGFR: 91 ML/MIN/1.73M2 — SIGNIFICANT CHANGE UP
EOSINOPHIL # BLD AUTO: 0.15 K/UL — SIGNIFICANT CHANGE UP (ref 0–0.5)
EOSINOPHIL NFR BLD AUTO: 6.5 % — HIGH (ref 0–6)
GIANT PLATELETS BLD QL SMEAR: PRESENT — SIGNIFICANT CHANGE UP
GLUCOSE SERPL-MCNC: 77 MG/DL — SIGNIFICANT CHANGE UP (ref 70–99)
HCT VFR BLD CALC: 30.7 % — LOW (ref 34.5–45)
HGB BLD-MCNC: 10 G/DL — LOW (ref 11.5–15.5)
INR BLD: 2.25 RATIO — HIGH (ref 0.85–1.16)
LYMPHOCYTES # BLD AUTO: 0.98 K/UL — LOW (ref 1–3.3)
LYMPHOCYTES # BLD AUTO: 42.8 % — SIGNIFICANT CHANGE UP (ref 13–44)
MANUAL SMEAR VERIFICATION: SIGNIFICANT CHANGE UP
MCHC RBC-ENTMCNC: 26.9 PG — LOW (ref 27–34)
MCHC RBC-ENTMCNC: 32.6 G/DL — SIGNIFICANT CHANGE UP (ref 32–36)
MCV RBC AUTO: 82.5 FL — SIGNIFICANT CHANGE UP (ref 80–100)
MONOCYTES # BLD AUTO: 0.36 K/UL — SIGNIFICANT CHANGE UP (ref 0–0.9)
MONOCYTES NFR BLD AUTO: 15.6 % — HIGH (ref 2–14)
NEUTROPHILS # BLD AUTO: 0.8 K/UL — LOW (ref 1.8–7.4)
NEUTROPHILS NFR BLD AUTO: 35.1 % — LOW (ref 43–77)
PLAT MORPH BLD: ABNORMAL
PLATELET # BLD AUTO: 286 K/UL — SIGNIFICANT CHANGE UP (ref 150–400)
POTASSIUM SERPL-MCNC: 4.3 MMOL/L — SIGNIFICANT CHANGE UP (ref 3.5–5.3)
POTASSIUM SERPL-SCNC: 4.3 MMOL/L — SIGNIFICANT CHANGE UP (ref 3.5–5.3)
PROTHROM AB SERPL-ACNC: 25.7 SEC — HIGH (ref 9.9–13.4)
RBC # BLD: 3.72 M/UL — LOW (ref 3.8–5.2)
RBC # FLD: 15.7 % — HIGH (ref 10.3–14.5)
RBC BLD AUTO: SIGNIFICANT CHANGE UP
SODIUM SERPL-SCNC: 131 MMOL/L — LOW (ref 135–145)
WBC # BLD: 2.28 K/UL — LOW (ref 3.8–10.5)
WBC # FLD AUTO: 2.28 K/UL — LOW (ref 3.8–10.5)

## 2024-11-13 RX ORDER — WARFARIN SODIUM 4 MG
4 TABLET ORAL ONCE
Refills: 0 | Status: COMPLETED | OUTPATIENT
Start: 2024-11-13 | End: 2024-11-13

## 2024-11-13 RX ORDER — SODIUM CHLORIDE 9 MG/ML
1 INJECTION, SOLUTION INTRAMUSCULAR; INTRAVENOUS; SUBCUTANEOUS DAILY
Refills: 0 | Status: DISCONTINUED | OUTPATIENT
Start: 2024-11-13 | End: 2024-11-15

## 2024-11-13 RX ADMIN — Medication 4 MILLIGRAM(S): at 22:30

## 2024-11-13 RX ADMIN — SODIUM CHLORIDE 1 GRAM(S): 9 INJECTION, SOLUTION INTRAMUSCULAR; INTRAVENOUS; SUBCUTANEOUS at 18:35

## 2024-11-13 RX ADMIN — Medication 25 MICROGRAM(S): at 05:56

## 2024-11-13 RX ADMIN — PANTOPRAZOLE SODIUM 40 MILLIGRAM(S): 40 TABLET, DELAYED RELEASE ORAL at 05:56

## 2024-11-13 RX ADMIN — Medication 3 MILLIGRAM(S): at 22:30

## 2024-11-13 RX ADMIN — Medication 5 MILLIGRAM(S): at 05:56

## 2024-11-13 RX ADMIN — DULOXETINE HYDROCHLORIDE 30 MILLIGRAM(S): 30 CAPSULE, DELAYED RELEASE ORAL at 13:05

## 2024-11-14 LAB
ANION GAP SERPL CALC-SCNC: 9 MMOL/L — SIGNIFICANT CHANGE UP (ref 5–17)
APTT BLD: 38.6 SEC — HIGH (ref 24.5–35.6)
BUN SERPL-MCNC: 23 MG/DL — SIGNIFICANT CHANGE UP (ref 7–23)
CALCIUM SERPL-MCNC: 9.1 MG/DL — SIGNIFICANT CHANGE UP (ref 8.4–10.5)
CHLORIDE SERPL-SCNC: 98 MMOL/L — SIGNIFICANT CHANGE UP (ref 96–108)
CO2 SERPL-SCNC: 27 MMOL/L — SIGNIFICANT CHANGE UP (ref 22–31)
CREAT SERPL-MCNC: 0.53 MG/DL — SIGNIFICANT CHANGE UP (ref 0.5–1.3)
EGFR: 88 ML/MIN/1.73M2 — SIGNIFICANT CHANGE UP
FOLATE SERPL-MCNC: 15.1 NG/ML — SIGNIFICANT CHANGE UP
GLUCOSE SERPL-MCNC: 74 MG/DL — SIGNIFICANT CHANGE UP (ref 70–99)
INR BLD: 2 RATIO — HIGH (ref 0.85–1.16)
POTASSIUM SERPL-MCNC: 4.9 MMOL/L — SIGNIFICANT CHANGE UP (ref 3.5–5.3)
POTASSIUM SERPL-SCNC: 4.9 MMOL/L — SIGNIFICANT CHANGE UP (ref 3.5–5.3)
PROTHROM AB SERPL-ACNC: 22.7 SEC — HIGH (ref 9.9–13.4)
SODIUM SERPL-SCNC: 134 MMOL/L — LOW (ref 135–145)
VIT B12 SERPL-MCNC: 614 PG/ML — SIGNIFICANT CHANGE UP (ref 232–1245)

## 2024-11-14 RX ORDER — WARFARIN SODIUM 4 MG
4.5 TABLET ORAL ONCE
Refills: 0 | Status: COMPLETED | OUTPATIENT
Start: 2024-11-14 | End: 2024-11-14

## 2024-11-14 RX ORDER — MELATONIN 5 MG
1 TABLET ORAL
Qty: 30 | Refills: 0
Start: 2024-11-14 | End: 2024-12-13

## 2024-11-14 RX ORDER — LEVOTHYROXINE SODIUM 88 MCG
1 TABLET ORAL
Qty: 30 | Refills: 0
Start: 2024-11-14 | End: 2024-12-13

## 2024-11-14 RX ORDER — LISINOPRIL 40 MG
1 TABLET ORAL
Qty: 30 | Refills: 0
Start: 2024-11-14 | End: 2024-12-13

## 2024-11-14 RX ORDER — B-COMPLEX WITH VITAMIN C
1 VIAL (ML) INJECTION
Qty: 30 | Refills: 0
Start: 2024-11-14 | End: 2024-12-13

## 2024-11-14 RX ORDER — ASCORBIC ACID 500 MG
1 TABLET ORAL
Qty: 30 | Refills: 0
Start: 2024-11-14 | End: 2024-12-13

## 2024-11-14 RX ORDER — LIDOCAINE HYDROCHLORIDE 40 MG/ML
1 SOLUTION TOPICAL
Qty: 5 | Refills: 0
Start: 2024-11-14 | End: 2024-12-13

## 2024-11-14 RX ORDER — PANTOPRAZOLE SODIUM 40 MG/1
1 TABLET, DELAYED RELEASE ORAL
Qty: 30 | Refills: 0
Start: 2024-11-14 | End: 2024-12-13

## 2024-11-14 RX ORDER — POLYETHYLENE GLYCOL 3350 17 G/17G
17 POWDER, FOR SOLUTION ORAL
Qty: 1020 | Refills: 0
Start: 2024-11-14 | End: 2024-12-13

## 2024-11-14 RX ORDER — ACETAMINOPHEN 500 MG
2 TABLET ORAL
Qty: 240 | Refills: 0
Start: 2024-11-14 | End: 2024-12-13

## 2024-11-14 RX ORDER — WARFARIN SODIUM 4 MG
4 TABLET ORAL ONCE
Refills: 0 | Status: DISCONTINUED | OUTPATIENT
Start: 2024-11-14 | End: 2024-11-14

## 2024-11-14 RX ORDER — SENNA 187 MG
2 TABLET ORAL
Qty: 60 | Refills: 0
Start: 2024-11-14 | End: 2024-12-13

## 2024-11-14 RX ORDER — DULOXETINE HYDROCHLORIDE 30 MG/1
1 CAPSULE, DELAYED RELEASE ORAL
Qty: 30 | Refills: 0
Start: 2024-11-14 | End: 2024-12-13

## 2024-11-14 RX ORDER — WARFARIN SODIUM 4 MG
1 TABLET ORAL
Qty: 30 | Refills: 0
Start: 2024-11-14 | End: 2024-12-13

## 2024-11-14 RX ADMIN — SODIUM CHLORIDE 1 GRAM(S): 9 INJECTION, SOLUTION INTRAMUSCULAR; INTRAVENOUS; SUBCUTANEOUS at 12:59

## 2024-11-14 RX ADMIN — PANTOPRAZOLE SODIUM 40 MILLIGRAM(S): 40 TABLET, DELAYED RELEASE ORAL at 06:25

## 2024-11-14 RX ADMIN — Medication 5 MILLIGRAM(S): at 06:25

## 2024-11-14 RX ADMIN — Medication 4.5 MILLIGRAM(S): at 22:21

## 2024-11-14 RX ADMIN — DULOXETINE HYDROCHLORIDE 30 MILLIGRAM(S): 30 CAPSULE, DELAYED RELEASE ORAL at 12:59

## 2024-11-14 RX ADMIN — Medication 25 MICROGRAM(S): at 06:25

## 2024-11-14 RX ADMIN — Medication 3 MILLIGRAM(S): at 22:03

## 2024-11-14 NOTE — PROGRESS NOTE ADULT - TIME BILLING
Discussed with PA
Discussed with  and PA about discharge plan
Discussed with  and PA about discharge plan
Discussed with PA
Discussed with ACP
Discussed with ACP
Discussed with PA
Discussed with PA

## 2024-11-14 NOTE — PROGRESS NOTE ADULT - NUTRITIONAL ASSESSMENT
This patient has been assessed with a concern for Malnutrition and has been determined to have a diagnosis/diagnoses of Severe protein-calorie malnutrition and Underweight (BMI < 19).    This patient is being managed with:   Diet DASH/TLC-  Sodium & Cholesterol Restricted  Entered: Oct 31 2024  8:30AM  
This patient has been assessed with a concern for Malnutrition and has been determined to have a diagnosis/diagnoses of Severe protein-calorie malnutrition and Underweight (BMI < 19).    This patient is being managed with:   Diet DASH/TLC-  Sodium & Cholesterol Restricted  Entered: Oct 31 2024  8:30AM    The following pending diet order is being considered for treatment of Severe protein-calorie malnutrition and Underweight (BMI < 19):  Diet Regular-  Prosource Gelatein Plus     Qty per Day:  1  Supplement Feeding Modality:  Oral  Ensure Plus High Protein Cans or Servings Per Day:  2       Frequency:  Daily  Entered: Nov 12 2024 11:33AM  
This patient has been assessed with a concern for Malnutrition and has been determined to have a diagnosis/diagnoses of Severe protein-calorie malnutrition and Underweight (BMI < 19).    This patient is being managed with:   Diet DASH/TLC-  Sodium & Cholesterol Restricted  Entered: Oct 31 2024  8:30AM  
This patient has been assessed with a concern for Malnutrition and has been determined to have a diagnosis/diagnoses of Severe protein-calorie malnutrition and Underweight (BMI < 19).    This patient is being managed with:   Diet DASH/TLC-  Sodium & Cholesterol Restricted  Entered: Oct 31 2024  8:30AM    The following pending diet order is being considered for treatment of Severe protein-calorie malnutrition and Underweight (BMI < 19):  Diet Regular-  Prosource Gelatein Plus     Qty per Day:  1  Supplement Feeding Modality:  Oral  Ensure Plus High Protein Cans or Servings Per Day:  2       Frequency:  Daily  Entered: Nov 12 2024 11:33AM  
This patient has been assessed with a concern for Malnutrition and has been determined to have a diagnosis/diagnoses of Severe protein-calorie malnutrition and Underweight (BMI < 19).    This patient is being managed with:   Diet DASH/TLC-  Sodium & Cholesterol Restricted  Entered: Oct 31 2024  8:30AM  
This patient has been assessed with a concern for Malnutrition and has been determined to have a diagnosis/diagnoses of Severe protein-calorie malnutrition and Underweight (BMI < 19).    This patient is being managed with:   Diet DASH/TLC-  Sodium & Cholesterol Restricted  Entered: Oct 31 2024  8:30AM  
This patient has been assessed with a concern for Malnutrition and has been determined to have a diagnosis/diagnoses of Severe protein-calorie malnutrition and Underweight (BMI < 19).    This patient is being managed with:   Diet DASH/TLC-  Sodium & Cholesterol Restricted  Entered: Oct 31 2024  8:30AM    The following pending diet order is being considered for treatment of Severe protein-calorie malnutrition and Underweight (BMI < 19):  Diet Regular-  Prosource Gelatein Plus     Qty per Day:  1  Supplement Feeding Modality:  Oral  Ensure Plus High Protein Cans or Servings Per Day:  2       Frequency:  Daily  Entered: Nov 12 2024 11:33AM  
This patient has been assessed with a concern for Malnutrition and has been determined to have a diagnosis/diagnoses of Severe protein-calorie malnutrition and Underweight (BMI < 19).    This patient is being managed with:   Diet DASH/TLC-  Sodium & Cholesterol Restricted  Entered: Oct 31 2024  8:30AM

## 2024-11-15 ENCOUNTER — TRANSCRIPTION ENCOUNTER (OUTPATIENT)
Age: 89
End: 2024-11-15

## 2024-11-15 VITALS
HEART RATE: 80 BPM | OXYGEN SATURATION: 97 % | TEMPERATURE: 98 F | DIASTOLIC BLOOD PRESSURE: 80 MMHG | RESPIRATION RATE: 18 BRPM | SYSTOLIC BLOOD PRESSURE: 143 MMHG

## 2024-11-15 LAB
ANA PAT FLD IF-IMP: ABNORMAL
ANA TITR SER: ABNORMAL
ANION GAP SERPL CALC-SCNC: 9 MMOL/L — SIGNIFICANT CHANGE UP (ref 5–17)
BUN SERPL-MCNC: 23 MG/DL — SIGNIFICANT CHANGE UP (ref 7–23)
CALCIUM SERPL-MCNC: 8.8 MG/DL — SIGNIFICANT CHANGE UP (ref 8.4–10.5)
CHLORIDE SERPL-SCNC: 99 MMOL/L — SIGNIFICANT CHANGE UP (ref 96–108)
CO2 SERPL-SCNC: 25 MMOL/L — SIGNIFICANT CHANGE UP (ref 22–31)
CREAT SERPL-MCNC: 0.49 MG/DL — LOW (ref 0.5–1.3)
EGFR: 90 ML/MIN/1.73M2 — SIGNIFICANT CHANGE UP
GLUCOSE SERPL-MCNC: 76 MG/DL — SIGNIFICANT CHANGE UP (ref 70–99)
INR BLD: 2.21 RATIO — HIGH (ref 0.85–1.16)
POTASSIUM SERPL-MCNC: 4.4 MMOL/L — SIGNIFICANT CHANGE UP (ref 3.5–5.3)
POTASSIUM SERPL-SCNC: 4.4 MMOL/L — SIGNIFICANT CHANGE UP (ref 3.5–5.3)
PROTHROM AB SERPL-ACNC: 25.2 SEC — HIGH (ref 9.9–13.4)
SODIUM SERPL-SCNC: 133 MMOL/L — LOW (ref 135–145)

## 2024-11-15 PROCEDURE — 85610 PROTHROMBIN TIME: CPT

## 2024-11-15 PROCEDURE — 86900 BLOOD TYPING SEROLOGIC ABO: CPT

## 2024-11-15 PROCEDURE — 83735 ASSAY OF MAGNESIUM: CPT

## 2024-11-15 PROCEDURE — 85025 COMPLETE CBC W/AUTO DIFF WBC: CPT

## 2024-11-15 PROCEDURE — 80053 COMPREHEN METABOLIC PANEL: CPT

## 2024-11-15 PROCEDURE — 86038 ANTINUCLEAR ANTIBODIES: CPT

## 2024-11-15 PROCEDURE — 84100 ASSAY OF PHOSPHORUS: CPT

## 2024-11-15 PROCEDURE — 99285 EMERGENCY DEPT VISIT HI MDM: CPT

## 2024-11-15 PROCEDURE — 86901 BLOOD TYPING SEROLOGIC RH(D): CPT

## 2024-11-15 PROCEDURE — 71045 X-RAY EXAM CHEST 1 VIEW: CPT

## 2024-11-15 PROCEDURE — 74177 CT ABD & PELVIS W/CONTRAST: CPT | Mod: MC

## 2024-11-15 PROCEDURE — 85027 COMPLETE CBC AUTOMATED: CPT

## 2024-11-15 PROCEDURE — 85730 THROMBOPLASTIN TIME PARTIAL: CPT

## 2024-11-15 PROCEDURE — 83690 ASSAY OF LIPASE: CPT

## 2024-11-15 PROCEDURE — 36415 COLL VENOUS BLD VENIPUNCTURE: CPT

## 2024-11-15 PROCEDURE — 80048 BASIC METABOLIC PNL TOTAL CA: CPT

## 2024-11-15 PROCEDURE — 87637 SARSCOV2&INF A&B&RSV AMP PRB: CPT

## 2024-11-15 PROCEDURE — 82746 ASSAY OF FOLIC ACID SERUM: CPT

## 2024-11-15 PROCEDURE — 97162 PT EVAL MOD COMPLEX 30 MIN: CPT

## 2024-11-15 PROCEDURE — 97110 THERAPEUTIC EXERCISES: CPT

## 2024-11-15 PROCEDURE — 70450 CT HEAD/BRAIN W/O DYE: CPT | Mod: MC

## 2024-11-15 PROCEDURE — 86850 RBC ANTIBODY SCREEN: CPT

## 2024-11-15 PROCEDURE — 87086 URINE CULTURE/COLONY COUNT: CPT

## 2024-11-15 PROCEDURE — 97530 THERAPEUTIC ACTIVITIES: CPT

## 2024-11-15 PROCEDURE — 81001 URINALYSIS AUTO W/SCOPE: CPT

## 2024-11-15 PROCEDURE — 82607 VITAMIN B-12: CPT

## 2024-11-15 PROCEDURE — 84443 ASSAY THYROID STIM HORMONE: CPT

## 2024-11-15 PROCEDURE — 84134 ASSAY OF PREALBUMIN: CPT

## 2024-11-15 RX ADMIN — SODIUM CHLORIDE 1 GRAM(S): 9 INJECTION, SOLUTION INTRAMUSCULAR; INTRAVENOUS; SUBCUTANEOUS at 11:09

## 2024-11-15 RX ADMIN — PANTOPRAZOLE SODIUM 40 MILLIGRAM(S): 40 TABLET, DELAYED RELEASE ORAL at 05:29

## 2024-11-15 RX ADMIN — Medication 5 MILLIGRAM(S): at 05:30

## 2024-11-15 RX ADMIN — Medication 25 MICROGRAM(S): at 05:30

## 2024-11-15 NOTE — PROGRESS NOTE ADULT - PROVIDER SPECIALTY LIST ADULT
Internal Medicine
Heme/Onc
Internal Medicine
Heme/Onc
Internal Medicine
Heme/Onc

## 2024-11-15 NOTE — PROGRESS NOTE ADULT - PROBLEM SELECTOR PROBLEM 4
R/O Vaginal mass
R/O Vaginal mass
Adult failure to thrive
Acquired neutropenia
R/O Vaginal mass
Acquired neutropenia

## 2024-11-15 NOTE — DISCHARGE NOTE NURSING/CASE MANAGEMENT/SOCIAL WORK - FINANCIAL ASSISTANCE
[facility] provides services at a reduced cost to those who are determined to be eligible through [facility]’s financial assistance program. Information regarding [facility]’s financial assistance program can be found by going to [email or web address] or by calling [phone].

## 2024-11-15 NOTE — PROGRESS NOTE ADULT - PROBLEM SELECTOR PLAN 3
continue present medication

## 2024-11-15 NOTE — DISCHARGE NOTE NURSING/CASE MANAGEMENT/SOCIAL WORK - NSDCPEFALRISK_GEN_ALL_CORE
For information on Fall & Injury Prevention, visit: [email or web address] OR  [email or web address] OR  https://www.cdc.gov/steadi/patient.html

## 2024-11-15 NOTE — DISCHARGE NOTE NURSING/CASE MANAGEMENT/SOCIAL WORK - PATIENT PORTAL LINK FT
You can access the FollowMyHealth Patient Portal offered by [facility] by registering at the following website: [email or web address]. By joining [facility]’s FollowMyHealth portal, you will also be able to view your health information using other applications (apps) compatible with our system.

## 2024-11-15 NOTE — PROGRESS NOTE ADULT - ASSESSMENT
88 yo F with PMH of  Advanced debility, adult FTT,  Muscle wasting and atrophy, chronic A-Fib, Severe protein-calorie malnutrition, Underweight (BMI < 19), HTN. HLD, Hypothyroidism, Anemia of poor PO intake, at risk of malnutrition, constipation, unsteady gait. who was found with a vaginal mass and some delusional thought which could be due to UTI was transferred to ER for further urology evaluation. She claimed in ER she was examined by a male nurse for this vaginal mass without chaperone however based on our investigation from [facility], the exam was done with patient's consent and chaperoned with one of female aid, all documented in facility notes. She was found with a vaginal mass and per her daughter request she was sent to hospital.     UC contaminated treat with 5 days IV ceftriaxone.   Vaginal mass -Daughter agreed for urology consult. F/u urology.  Bladder wall thickening -possible malignancy with a lot of diverticulum and stone in bladder. Still refusing urological exam and workup . Daughter  agreed for urology consult.  F/u palliative team and discharge plan.   AFIB - Coumadin. Monitor PT/INR.  Protein calorie malnutrition - as above.   Hypothyroid - on Levothroid 25 mcg, daily, monitor TSH.   Depression - on Duloxetine  DVT ppx - Coumadin  discharge plan- f/u  for placement.   
clinically stable
89 yo F with PMH of  Advanced debility, adult FTT,  Muscle wasting and atrophy, chronic A-Fib, Severe protein-calorie malnutrition, Underweight (BMI < 19), HTN. HLD, Hypothyroidism, Anemia of poor PO intake, at risk of malnutrition, constipation, unsteady gait. who was found with a vaginal mass and some delusional thought which could be due to UTI was transferred to ER for further urology evaluation. She claimed in ER she was examined by a male nurse for this vaginal mass without chaperone however based on our investigation from [facility], the exam was done with patient's consent and chaperoned with one of female aid, all documented in facility notes. She was found with a vaginal mass and per her daughter request she was sent to hospital.     UC contaminated treat with 5 days IV ceftriaxone.   Vaginal mass - refused examination . F/urology.  Bladder wall thickening -possible malignancy with a lot of diverticulum and stone in bladder needs urological exam and workup but wants to discuss with daughter before proceeding with consult.   AFIB - Coumadin. Monitor PT/INR.  Protein calorie malnutrition - as above.   Hypothyroid - on Levothroid 25 mcg, daily, monitor TSH.   Depression - on Duloxetine  DVT ppx - Coumadin  
89 yo F with PMH of  Advanced debility, adult FTT,  Muscle wasting and atrophy, chronic A-Fib, Severe protein-calorie malnutrition, Underweight (BMI < 19), HTN. HLD, Hypothyroidism, Anemia of poor PO intake, at risk of malnutrition, constipation, unsteady gait. who was found with a vaginal mass and some delusional thought which could be due to UTI was transferred to ER for further urology evaluation. She claimed in ER she was examined by a male nurse for this vaginal mass without chaperone however based on our investigation from [facility], the exam was done with patient's consent and chaperoned with one of female aid, all documented in facility notes. She was found with a vaginal mass and per her daughter request she was sent to hospital.     UC contaminated treat with 5 days IV ceftriaxone.   Vaginal mass - refused examination . F/urology.  Bladder wall thickening -possible malignancy with a lot of diverticulum and stone in bladder. Still refusing urological exam and workup . Daughter unavailable. F/u palliative team and discharge plan.   AFIB - Coumadin. Monitor PT/INR.  Protein calorie malnutrition - as above.   Hypothyroid - on Levothroid 25 mcg, daily, monitor TSH.   Depression - on Duloxetine  DVT ppx - Coumadin  discharge plan- f/u  for placement.   
89 yo F with PMH of  Advanced debility, adult FTT,  Muscle wasting and atrophy, chronic A-Fib, Severe protein-calorie malnutrition, Underweight (BMI < 19), HTN. HLD, Hypothyroidism, Anemia of poor PO intake, at risk of malnutrition, constipation, unsteady gait. who was found with a vaginal mass and some delusional thought which could be due to UTI was transferred to ER for further urology evaluation. She claimed in ER she was examined by a male nurse for this vaginal mass without chaperone however based on our investigation from [facility], the exam was done with patient's consent and chaperoned with one of female aid, all documented in facility notes. She was found with a vaginal mass and per her daughter request she was sent to hospital.     UC contaminated treat with 5 days IV ceftriaxone.   Vaginal mass -Daughter agreed for urology consult. F/u urology.  Bladder wall thickening -possible malignancy with a lot of diverticulum and stone in bladder. Evaluated by urology who recommended f/u office as outpatient.   AFIB - Coumadin. Monitor PT/INR.  Protein calorie malnutrition - as above.   Hypothyroid - on Levothroid 25 mcg, daily, monitor TSH.   Depression - on Duloxetine  DVT ppx - Coumadin  discharge plan- f/u  for placement.   
89 yo F with PMH of  Advanced debility, adult FTT,  Muscle wasting and atrophy, chronic A-Fib, Severe protein-calorie malnutrition, Underweight (BMI < 19), HTN. HLD, Hypothyroidism, Anemia of poor PO intake, at risk of malnutrition, constipation, unsteady gait. who was found with a vaginal mass and some delusional thought which could be due to UTI was transferred to ER for further urology evaluation. She claimed in ER she was examined by a male nurse for this vaginal mass without chaperone however based on our investigation from [facility], the exam was done with patient's consent and chaperoned with one of female aid, all documented in facility notes. She was found with a vaginal mass and per her daughter request she was sent to hospital.     UTI-resolved. Asymptomatic. Completed  treatment  with 5 days IV ceftriaxone.   Vaginal mass -Daughter agreed for urology consult. F/u urology.  Bladder wall thickening -possible malignancy with a lot of diverticulum and stone in bladder. Evaluated by urology who recommended f/u office as outpatient.   AFIB - Coumadin. Monitor PT/INR. INR therapeutic. Continue Coumadin 3 mg. Off of Lovenox.   Protein calorie malnutrition - as above.   Hypothyroid - on Levothroid 25 mcg, daily, monitor TSH.   Depression - on Duloxetine  DVT ppx - Coumadin    placement- Patient going to assisted living. Medically clear. Assisted living form filed and signed.   
88 yo F with PMH of  Advanced debility, adult FTT,  Muscle wasting and atrophy, chronic A-Fib, Severe protein-calorie malnutrition, Underweight (BMI < 19), HTN. HLD, Hypothyroidism, Anemia of poor PO intake, at risk of malnutrition, constipation, unsteady gait. who was found with a vaginal mass and some delusional thought which could be due to UTI was transferred to ER for further urology evaluation. She claimed in ER she was examined by a male nurse for this vaginal mass without chaperone however based on our investigation from [facility], the exam was done with patient's consent and chaperoned with one of female aid, all documented in facility notes. She was found with a vaginal mass and per her daughter request she was sent to hospital.     UC contaminated treat with 5 days IV ceftriaxone.   Vaginal mass -Daughter agreed for urology consult. F/u urology.  Bladder wall thickening -possible malignancy with a lot of diverticulum and stone in bladder. Evaluated by urology who recommended f/u office as outpatient.   AFIB - Coumadin. Monitor PT/INR.  Protein calorie malnutrition - as above.   Hypothyroid - on Levothroid 25 mcg, daily, monitor TSH.   Depression - on Duloxetine  DVT ppx - Coumadin  discharge plan- f/u  for placement.   
88 yo F with PMH of  Advanced debility, adult FTT,  Muscle wasting and atrophy, chronic A-Fib, Severe protein-calorie malnutrition, Underweight (BMI < 19), HTN. HLD, Hypothyroidism, Anemia of poor PO intake, at risk of malnutrition, constipation, unsteady gait. who was found with a vaginal mass and some delusional thought which could be due to UTI was transferred to ER for further urology evaluation. She claimed in ER she was examined by a male nurse for this vaginal mass without chaperone however based on our investigation from [facility], the exam was done with patient's consent and chaperoned with one of female aid, all documented in facility notes. She was found with a vaginal mass and per her daughter request she was sent to hospital.     UC contaminated treat with 5 days IV ceftriaxone.   Vaginal mass -Daughter agreed for urology consult. F/u urology.  Bladder wall thickening -possible malignancy with a lot of diverticulum and stone in bladder. Evaluated by urology who recommended f/u office as outpatient.   AFIB - Coumadin. Monitor PT/INR. INR therapeutic. Continue Coumadin 3 mg. Off of Lovenox.   Protein calorie malnutrition - as above.   Hypothyroid - on Levothroid 25 mcg, daily, monitor TSH.   Depression - on Duloxetine  DVT ppx - Coumadin  discharge plan- f/u  for placement.   
89 yo F with PMH of  Advanced debility, adult FTT,  Muscle wasting and atrophy, chronic A-Fib, Severe protein-calorie malnutrition, Underweight (BMI < 19), HTN. HLD, Hypothyroidism, Anemia of poor PO intake, at risk of malnutrition, constipation, unsteady gait. who was found with a vaginal mass and some delusional thought which could be due to UTI was transferred to ER for further urology evaluation. She claimed in ER she was examined by a male nurse for this vaginal mass without chaperone however based on our investigation from [facility], the exam was done with patient's consent and chaperoned with one of female aid, all documented in facility notes. She was found with a vaginal mass and per her daughter request she was sent to hospital.     UC contaminated treat with 5 days IV ceftriaxone.   Vaginal mass -Daughter agreed for urology consult. F/u urology.  Bladder wall thickening -possible malignancy with a lot of diverticulum and stone in bladder. Evaluated by urology who recommended f/u office as outpatient.   AFIB - Coumadin. Monitor PT/INR. INR therapeutic. Continue Coumadin 4 mg. Off of Lovenox.   Protein calorie malnutrition - as above.   Hypothyroid - on Levothroid 25 mcg, daily, monitor TSH.   Depression - on Duloxetine  DVT ppx - Coumadin  discharge plan- f/u  for placement.   
88 yo F with PMH of  Advanced debility, adult FTT,  Muscle wasting and atrophy, chronic A-Fib, Severe protein-calorie malnutrition, Underweight (BMI < 19), HTN. HLD, Hypothyroidism, Anemia of poor PO intake, at risk of malnutrition, constipation, unsteady gait. who was found with a vaginal mass and some delusional thought which could be due to UTI was transferred to ER for further urology evaluation. She claimed in ER she was examined by a male nurse for this vaginal mass without chaperone however based on our investigation from [facility], the exam was done with patient's consent and chaperoned with one of female aid, all documented in facility notes. She was found with a vaginal mass and per her daughter request she was sent to hospital.     UC contaminated treat with 5 days IV ceftriaxone.   Vaginal mass -Daughter agreed for urology consult. F/u urology.  Bladder wall thickening -possible malignancy with a lot of diverticulum and stone in bladder. Evaluated by urology who recommended f/u office as outpatient.   AFIB - Coumadin. Monitor PT/INR. INR therapeutic. Continue Coumadin 3 mg. Off of Lovenox.   Protein calorie malnutrition - as above.   Hypothyroid - on Levothroid 25 mcg, daily, monitor TSH.   Depression - on Duloxetine  DVT ppx - Coumadin  discharge plan- f/u  for placement.   
88 yo F with PMH of  Advanced debility, adult FTT,  Muscle wasting and atrophy, chronic A-Fib, Severe protein-calorie malnutrition, Underweight (BMI < 19), HTN. HLD, Hypothyroidism, Anemia of poor PO intake, at risk of malnutrition, constipation, unsteady gait. who was found with a vaginal mass and some delusional thought which could be due to UTI was transferred to ER for further urology evaluation. She claimed in ER she was examined by a male nurse for this vaginal mass without chaperone however based on our investigation from [facility], the exam was done with patient's consent and chaperoned with one of female aid, all documented in facility notes. She was found with a vaginal mass and per her daughter request she was sent to hospital.     UTI-resolved. Asymptomatic. Completed  treatment  with 5 days IV ceftriaxone.   Vaginal mass -Daughter agreed for urology consult. F/u urology.  Bladder wall thickening -possible malignancy with a lot of diverticulum and stone in bladder. Evaluated by urology who recommended f/u office as outpatient.   AFIB - Coumadin. Monitor PT/INR. INR therapeutic. Continue Coumadin 3 mg. Off of Lovenox.   Protein calorie malnutrition - as above.   Hypothyroid - on Levothroid 25 mcg, daily, monitor TSH.   Depression - on Duloxetine  DVT ppx - Coumadin    placement- Patient going to assisted living. Medically clear. Assisted living form filed and signed.   
INR is around 2.7
INR within therapeutic range
clnically stable
therapeutic INR
neutropenia persists

## 2024-11-15 NOTE — DISCHARGE NOTE NURSING/CASE MANAGEMENT/SOCIAL WORK - NSDCFUADDAPPT_GEN_ALL_CORE_FT
APPTS ARE READY TO BE MADE: [x ] YES    Best Family or Patient Contact (if needed):    Additional Information about above appointments (if needed):    1: pcp  2: urology  3:     Other comments or requests:

## 2024-11-15 NOTE — PROGRESS NOTE ADULT - PROBLEM SELECTOR PLAN 4
urology f/u as outpatient
check B12 & folate, SAMANTHA
outpatinet w/u
nutritional support w controlled dez K diet
outpatient f/u
pending SAMANTHA

## 2024-11-15 NOTE — PROGRESS NOTE ADULT - REASON FOR ADMISSION
UTI , vaginal mass

## 2024-11-15 NOTE — PROGRESS NOTE ADULT - SUBJECTIVE AND OBJECTIVE BOX
Feels OK, no chest pain  On coumadin; no clnical bleeding    REVIEW OF SYSTEMS:    CONSTITUTIONAL: No weakness, fevers or chills, weight loss  EYES/ENT: No visual changes, no throat pain   RESPIRATORY: No cough, wheezing, hemoptysis; No shortness of breath  CARDIOVASCULAR: No chest pain or palpitations  GASTROINTESTINAL: No abdominal, nausea, vomiting, or hematemesis; No diarrhea or constipation. No melena or hematochezia.    VITAL SIGNS:    T97.6    PHYSICAL EXAM:     GENERAL: no acute distress  HEENT: NC/AT, EOMI, neck supple, MMM  RESPIRATORY: LCTAB/L, no rhonchi, rales, or wheezing  CARDIOVASCULAR: RRR, no murmurs, gallops, rubs  ABDOMINAL: soft, non-tender, non-distended, positive bowel sounds   EXTREMITIES: no clubbing, cyanosis, or edema  NEUROLOGICAL: alert and oriented x 3, non-focal  SKIN: no rashes or lesions   MUSCULOSKELETAL: no gross joint deformity      11-15    133[L]  |  99  |  23  ----------------------------<  76  4.4   |  25  |  0.49[L]    Ca    8.8      15 Nov 2024 07:14        MEDICATIONS  (STANDING):  DULoxetine 30 milliGRAM(s) Oral daily  levothyroxine 25 MICROGram(s) Oral daily  lisinopril 5 milliGRAM(s) Oral daily  melatonin 3 milliGRAM(s) Oral at bedtime  pantoprazole    Tablet 40 milliGRAM(s) Oral before breakfast  sodium chloride 1 Gram(s) Oral daily      
No complaints    REVIEW OF SYSTEMS:    CONSTITUTIONAL: No weakness, fevers or chills, weight loss  EYES/ENT: No visual changes, no throat pain   RESPIRATORY: No cough, wheezing, hemoptysis; No shortness of breath  CARDIOVASCULAR: No chest pain or palpitations  GASTROINTESTINAL: No abdominal, nausea, vomiting, or hematemesis; No diarrhea or constipation. No melena or hematochezia.      VITAL SIGNS:    T98    PHYSICAL EXAM:     GENERAL: no acute distress  HEENT: NC/AT, EOMI, neck supple, MMM  RESPIRATORY: LCTAB/L, no rhonchi, rales, or wheezing  CARDIOVASCULAR: RRR, no murmurs, gallops, rubs  ABDOMINAL: soft, non-tender, non-distended, positive bowel sounds   EXTREMITIES: no clubbing, cyanosis, or edema  NEUROLOGICAL: alert and oriented x 3, non-focal  LYMPHATIC: lymphatic: cervical, supraclavicular, axilla, inguinal  SKIN: no rashes or lesions   MUSCULOSKELETAL: no gross joint deformity              MEDICATIONS  (STANDING):  DULoxetine 30 milliGRAM(s) Oral daily  levothyroxine 25 MICROGram(s) Oral daily  lisinopril 5 milliGRAM(s) Oral daily  melatonin 3 milliGRAM(s) Oral at bedtime  pantoprazole    Tablet 40 milliGRAM(s) Oral before breakfast  warfarin 4 milliGRAM(s) Oral once      
Patient is a 89y old  Female who presents with a chief complaint of UTI , vaginal mass (07 Nov 2024 16:30)      INTERVAL HPI/OVERNIGHT EVENTS: INR going down. Recommended to  start Lovenox for bridge however consulted Dr. Lainez who recommended  no longer need bridge therapy with INR 1.7 and Coumadin itself gives good protection for  stroke. Increased Coumadin to 5 mg also recommended to get rid of  green vegetables from her food.     Pain Location & Control:     MEDICATIONS  (STANDING):  DULoxetine 30 milliGRAM(s) Oral daily  levothyroxine 25 MICROGram(s) Oral daily  lisinopril 5 milliGRAM(s) Oral daily  melatonin 3 milliGRAM(s) Oral at bedtime  pantoprazole    Tablet 40 milliGRAM(s) Oral before breakfast  sodium chloride 0.9%. 1000 milliLiter(s) (50 mL/Hr) IV Continuous <Continuous>    MEDICATIONS  (PRN):  oxyCODONE    IR 2.5 milliGRAM(s) Oral every 6 hours PRN Severe Pain (7 - 10)      Allergies    No Known Allergies    Intolerances        REVIEW OF SYSTEMS:  CONSTITUTIONAL: No fever, weight loss, or fatigue  EYES: No eye pain, visual disturbances, or discharge  ENMT:  No difficulty hearing, tinnitus, vertigo; No sinus or throat pain  NECK: No pain or stiffness  BREASTS: No pain, masses, or nipple discharge  RESPIRATORY: No cough, wheezing, chills or hemoptysis; No shortness of breath  CARDIOVASCULAR: No chest pain, palpitations, dizziness, or leg swelling  GASTROINTESTINAL: No abdominal or epigastric pain. No nausea, vomiting, or hematemesis; No diarrhea or constipation. No melena or hematochezia.  GENITOURINARY: No dysuria, frequency, hematuria, or incontinence  NEUROLOGICAL: No headaches, memory loss, loss of strength, numbness, or tremors  SKIN: No itching, burning, rashes, or lesions   LYMPH NODES: No enlarged glands  ENDOCRINE: No heat or cold intolerance; No hair loss; No polydipsia or polyuria  MUSCULOSKELETAL: No back pain  PSYCHIATRIC: No depression, anxiety, mood swings, or difficulty sleeping  HEME/LYMPH: No easy bruising, or bleeding gums  ALLERGY AND IMMUNOLOGIC: No hives or eczema    Vital Signs Last 24 Hrs  T(C): 36.9 (07 Nov 2024 20:23), Max: 37.1 (07 Nov 2024 04:52)  T(F): 98.4 (07 Nov 2024 20:23), Max: 98.7 (07 Nov 2024 04:52)  HR: 79 (07 Nov 2024 20:23) (79 - 90)  BP: 127/80 (07 Nov 2024 20:23) (109/63 - 159/80)  BP(mean): --  RR: 18 (07 Nov 2024 20:23) (18 - 18)  SpO2: 97% (07 Nov 2024 20:23) (95% - 97%)    Parameters below as of 07 Nov 2024 20:23  Patient On (Oxygen Delivery Method): room air        PHYSICAL EXAM:  GENERAL: Patient gave permission for very limited exam only listening to heart which normal and regular. Seen and examined with female chaperone, PT student in the room.       LABS:                        9.7    2.76  )-----------( 309      ( 07 Nov 2024 07:41 )             30.5     07 Nov 2024 07:41    133    |  99     |  21     ----------------------------<  72     4.0     |  23     |  0.45     Ca    8.3        07 Nov 2024 07:41      PT/INR - ( 07 Nov 2024 07:41 )   PT: 19.9 sec;   INR: 1.76 ratio         PTT - ( 06 Nov 2024 07:18 )  PTT:33.8 sec  Urinalysis Basic - ( 07 Nov 2024 07:41 )    Color: x / Appearance: x / SG: x / pH: x  Gluc: 72 mg/dL / Ketone: x  / Bili: x / Urobili: x   Blood: x / Protein: x / Nitrite: x   Leuk Esterase: x / RBC: x / WBC x   Sq Epi: x / Non Sq Epi: x / Bacteria: x      CAPILLARY BLOOD GLUCOSE            Cultures      RADIOLOGY & ADDITIONAL TESTS:    Imaging Personally Reviewed:  [X ] YES  [ ] NO    Consultant(s) Notes Reviewed:  [ X] YES  [ ] NO    Care Discussed with Consultants/Other Providers [X ] YES  [ ] NO
Patient is a 89y old  Female who presents with a chief complaint of UTI , vaginal mass (07 Nov 2024 22:00)      INTERVAL HPI/OVERNIGHT EVENTS: INR improved up top 2.08. Continue Coumadin 4 mg tonight. Monitor PT/IN. Patient  medically clear for discharge, waiting for placement in new [facility].     Pain Location & Control:     MEDICATIONS  (STANDING):  DULoxetine 30 milliGRAM(s) Oral daily  levothyroxine 25 MICROGram(s) Oral daily  lisinopril 5 milliGRAM(s) Oral daily  melatonin 3 milliGRAM(s) Oral at bedtime  pantoprazole    Tablet 40 milliGRAM(s) Oral before breakfast  warfarin 4 milliGRAM(s) Oral once    MEDICATIONS  (PRN):      Allergies    No Known Allergies    Intolerances        REVIEW OF SYSTEMS:  CONSTITUTIONAL: No fever, weight loss, or fatigue  EYES: No eye pain, visual disturbances, or discharge  ENMT:  No difficulty hearing, tinnitus, vertigo; No sinus or throat pain  NECK: No pain or stiffness  BREASTS: No pain, masses, or nipple discharge  RESPIRATORY: No cough, wheezing, chills or hemoptysis; No shortness of breath  CARDIOVASCULAR: No chest pain, palpitations, dizziness, or leg swelling  GASTROINTESTINAL: No abdominal or epigastric pain. No nausea, vomiting, or hematemesis; No diarrhea or constipation. No melena or hematochezia.  GENITOURINARY: No dysuria, frequency, hematuria, or incontinence  NEUROLOGICAL: No headaches, memory loss, loss of strength, numbness, or tremors  SKIN: No itching, burning, rashes, or lesions   LYMPH NODES: No enlarged glands  ENDOCRINE: No heat or cold intolerance; No hair loss; No polydipsia or polyuria  MUSCULOSKELETAL: No back pain  PSYCHIATRIC: No depression, anxiety, mood swings, or difficulty sleeping  HEME/LYMPH: No easy bruising, or bleeding gums  ALLERGY AND IMMUNOLOGIC: No hives or eczema    Vital Signs Last 24 Hrs  T(C): 36.6 (08 Nov 2024 12:04), Max: 36.9 (07 Nov 2024 20:23)  T(F): 97.8 (08 Nov 2024 12:04), Max: 98.4 (07 Nov 2024 20:23)  HR: 81 (08 Nov 2024 12:04) (79 - 81)  BP: 116/79 (08 Nov 2024 15:22) (115/79 - 132/84)  BP(mean): --  RR: 18 (08 Nov 2024 12:04) (18 - 18)  SpO2: 96% (08 Nov 2024 15:22) (96% - 97%)    Parameters below as of 08 Nov 2024 15:22  Patient On (Oxygen Delivery Method): room air        PHYSICAL EXAM:  GENERAL: NAD, well-groomed, well-developed  HEAD:  Atraumatic, Normocephalic  EYES: EOMI, PERRLA, conjunctiva and sclera clear  ENMT: No tonsillar erythema, exudates, or enlargement; Moist mucous membranes, Good dentition, No lesions  NECK: Supple, No JVD, Normal thyroid  NERVOUS SYSTEM:  Alert & Oriented X 2  CHEST/LUNG: Clear to auscultation bilaterally; No rales, rhonchi, wheezing, or rubs  HEART: Regular rate and rhythm; No murmurs, rubs, or gallops  ABDOMEN: Soft, Nontender, Nondistended; Bowel sounds present  EXTREMITIES:  2+ Peripheral Pulses, No clubbing or cyanosis  LYMPH: No lymphadenopathy noted  SKIN: No rashes or lesions      LABS:      Ca    8.3        07 Nov 2024 07:41      PT/INR - ( 08 Nov 2024 10:40 )   PT: 23.5 sec;   INR: 2.08 ratio           Urinalysis Basic - ( 07 Nov 2024 07:41 )    Color: x / Appearance: x / SG: x / pH: x  Gluc: 72 mg/dL / Ketone: x  / Bili: x / Urobili: x   Blood: x / Protein: x / Nitrite: x   Leuk Esterase: x / RBC: x / WBC x   Sq Epi: x / Non Sq Epi: x / Bacteria: x      CAPILLARY BLOOD GLUCOSE            Cultures      RADIOLOGY & ADDITIONAL TESTS:    Imaging Personally Reviewed:  [X ] YES  [ ] NO    Consultant(s) Notes Reviewed:  [X ] YES  [ ] NO    Care Discussed with Consultants/Other Providers [ X] YES  [ ] NO
Date of Service: 11-09-24 @ 10:02           CARDIOLOGY     PROGRESS  NOTE   ________________________________________________    CHIEF COMPLAINT:Patient is a 89y old  Female who presents with a chief complaint of cervical mass (08 Nov 2024 18:32)  no complain  	  REVIEW OF SYSTEMS:  CONSTITUTIONAL: No fever, weight loss, or fatigue  EYES: No eye pain, visual disturbances, or discharge  ENT:  No difficulty hearing, tinnitus, vertigo; No sinus or throat pain  NECK: No pain or stiffness  RESPIRATORY: No cough, wheezing, chills or hemoptysis; No Shortness of Breath  CARDIOVASCULAR: No chest pain, palpitations, passing out, dizziness, or leg swelling  GASTROINTESTINAL: No abdominal or epigastric pain. No nausea, vomiting, or hematemesis; No diarrhea or constipation. No melena or hematochezia.  GENITOURINARY: No dysuria, frequency, hematuria, or incontinence  NEUROLOGICAL: No headaches, memory loss, loss of strength, numbness, or tremors  SKIN: No itching, burning, rashes, or lesions   LYMPH Nodes: No enlarged glands  ENDOCRINE: No heat or cold intolerance; No hair loss  MUSCULOSKELETAL: No joint pain or swelling; No muscle, back, or extremity pain  PSYCHIATRIC: No depression, anxiety, mood swings, or difficulty sleeping  HEME/LYMPH: No easy bruising, or bleeding gums  ALLERGY AND IMMUNOLOGIC: No hives or eczema	    [x ] All others negative	  [ ] Unable to obtain    PHYSICAL EXAM:  T(C): 36.7 (11-09-24 @ 04:43), Max: 36.7 (11-09-24 @ 04:43)  HR: 72 (11-09-24 @ 04:43) (72 - 97)  BP: 140/86 (11-09-24 @ 04:43) (115/79 - 140/86)  RR: 18 (11-09-24 @ 04:43) (18 - 18)  SpO2: 95% (11-09-24 @ 04:43) (95% - 96%)  Wt(kg): --  I&O's Summary    08 Nov 2024 07:01  -  09 Nov 2024 07:00  --------------------------------------------------------  IN: 840 mL / OUT: 400 mL / NET: 440 mL        Appearance: Normal	  HEENT:   Normal oral mucosa, PERRL, EOMI	  Lymphatic: No lymphadenopathy  Cardiovascular: Normal S1 S2, No JVD, + murmurs, No edema  Respiratory: rhonchi  Gastrointestinal:  Soft, Non-tender, + BS	  Skin: No rashes, No ecchymoses, No cyanosis	  Neurologic: Non-focal  Extremities: Normal range of motion, No clubbing, cyanosis or edema  Vascular: Peripheral pulses palpable 2+ bilaterally    MEDICATIONS  (STANDING):  DULoxetine 30 milliGRAM(s) Oral daily  levothyroxine 25 MICROGram(s) Oral daily  lisinopril 5 milliGRAM(s) Oral daily  melatonin 3 milliGRAM(s) Oral at bedtime  pantoprazole    Tablet 40 milliGRAM(s) Oral before breakfast      TELEMETRY: 	    ECG:  	  RADIOLOGY:  OTHER: 	  	  LABS:	 	    CARDIAC MARKERS:        proBNP:   Lipid Profile:   HgA1c:   TSH: Thyroid Stimulating Hormone, Serum: 3.23 uIU/mL (11-06 @ 07:19)    PT/INR - ( 09 Nov 2024 07:20 )   PT: 24.5 sec;   INR: 2.17 ratio        Assessment and plan  ---------------------------  89 yo F with PMH of  Advanced debility, adult FTT,  Muscle wasting and atrophy, chronic A-Fib, Severe protein-calorie malnutrition, Underweight (BMI < 19), HTN. HLD, Hypothyroidism, Anemia of poor PO intake, at risk of malnutrition, constipation, unsteady gait. who was found with a vaginal mass and some delusional thought which could be due to UTI was transferred to ER for further urology evaluation. She claimed in ER she was examined by a male nurse for this vaginal mass without chaperone however based on our investigation from [facility], the exam was done with patient's consent and chaperoned with one of female aid, all documented in facility notes. She was found with a vaginal mass and per her daughter request she was sent to hospital.   UC contaminated treat with 5 days IV ceftriaxone.   Vaginal mass -Daughter agreed for urology consult. F/u urology.  Bladder wall thickening -possible malignancy with a lot of diverticulum and stone in bladder. Evaluated by urology who recommended f/u office as outpatient.   AFIB - Coumadin. Monitor PT/INR. INR therapeutic. Continue Coumadin 4 mg. Off of Lovenox.   Protein calorie malnutrition - as above.   Hypothyroid - on Levothroid 25 mcg, daily, monitor TSH.   Depression - on Duloxetine  DVT ppx - Coumadin  discharge plan- f/u  for placement.   doing well, no complain, fu cbc as out pt    	        
Patient is a 89y old  Female who presents with a chief complaint of UTI , vaginal mass (12 Nov 2024 20:21)      INTERVAL HPI/OVERNIGHT EVENTS: Medically stable. INR therapeutic. Daughter  has some concern because of cloudy urine however  patient asymptomatic and already treated with IV ceftriaxone for 5 days and asymptomatic to begin with. Alert and oriented x3, no confusion, no dysuria, no blood in urine. Patient medically clear for discharge. I signed assisted living form  for  and given to Alyce  today. F/u  assisted living PCP.     Pain Location & Control:     MEDICATIONS  (STANDING):  DULoxetine 30 milliGRAM(s) Oral daily  levothyroxine 25 MICROGram(s) Oral daily  lisinopril 5 milliGRAM(s) Oral daily  melatonin 3 milliGRAM(s) Oral at bedtime  pantoprazole    Tablet 40 milliGRAM(s) Oral before breakfast  sodium chloride 1 Gram(s) Oral daily  warfarin 4 milliGRAM(s) Oral once    MEDICATIONS  (PRN):  senna 2 Tablet(s) Oral at bedtime PRN Constipation      Allergies    No Known Allergies    Intolerances        REVIEW OF SYSTEMS:  CONSTITUTIONAL: No fever, weight loss, or fatigue  EYES: No eye pain, visual disturbances, or discharge  ENMT:  No difficulty hearing, tinnitus, vertigo; No sinus or throat pain  NECK: No pain or stiffness  BREASTS: No pain, masses, or nipple discharge  RESPIRATORY: No cough, wheezing, chills or hemoptysis; No shortness of breath  CARDIOVASCULAR: No chest pain, palpitations, dizziness, or leg swelling  GASTROINTESTINAL: No abdominal or epigastric pain. No nausea, vomiting, or hematemesis; No diarrhea or constipation. No melena or hematochezia.  GENITOURINARY: No dysuria, frequency, hematuria, or incontinence  NEUROLOGICAL: No headaches, memory loss, loss of strength, numbness, or tremors  SKIN: No itching, burning, rashes, or lesions   LYMPH NODES: No enlarged glands  ENDOCRINE: No heat or cold intolerance; No hair loss; No polydipsia or polyuria  MUSCULOSKELETAL: No back pain  PSYCHIATRIC: No depression, anxiety, mood swings, or difficulty sleeping  HEME/LYMPH: No easy bruising, or bleeding gums  ALLERGY AND IMMUNOLOGIC: No hives or eczema    Vital Signs Last 24 Hrs  T(C): 36.6 (13 Nov 2024 11:44), Max: 36.9 (13 Nov 2024 05:23)  T(F): 97.9 (13 Nov 2024 11:44), Max: 98.4 (13 Nov 2024 05:23)  HR: 80 (13 Nov 2024 15:01) (75 - 84)  BP: 113/73 (13 Nov 2024 15:01) (112/76 - 132/70)  BP(mean): --  RR: 18 (13 Nov 2024 11:44) (18 - 18)  SpO2: 97% (13 Nov 2024 15:01) (97% - 97%)    Parameters below as of 13 Nov 2024 15:01  Patient On (Oxygen Delivery Method): room air        PHYSICAL EXAM:  GENERAL: NAD, well-groomed, well-developed  HEAD:  Atraumatic, Normocephalic  EYES: EOMI, PERRLA, conjunctiva and sclera clear  ENMT: No tonsillar erythema, exudates, or enlargement; Moist mucous membranes, Good dentition, No lesions  NECK: Supple, No JVD, Normal thyroid  NERVOUS SYSTEM:  Alert & Oriented X3, Good concentration; Motor Strength 5/5 B/L upper and lower extremities; DTRs 2+ intact and symmetric  CHEST/LUNG: Clear to auscultation bilaterally; No rales, rhonchi, wheezing, or rubs  HEART: Regular rate and rhythm; No murmurs, rubs, or gallops  ABDOMEN: Soft, Nontender, Nondistended; Bowel sounds present  EXTREMITIES:  2+ Peripheral Pulses, No clubbing or cyanosis  LYMPH: No lymphadenopathy noted  SKIN: No rashes or lesions      LABS:                        10.0   2.28  )-----------( 286      ( 13 Nov 2024 07:11 )             30.7     13 Nov 2024 06:58    131    |  97     |  22     ----------------------------<  77     4.3     |  23     |  0.47     Ca    8.9        13 Nov 2024 06:58      PT/INR - ( 13 Nov 2024 07:11 )   PT: 25.7 sec;   INR: 2.25 ratio           Urinalysis Basic - ( 13 Nov 2024 06:58 )    Color: x / Appearance: x / SG: x / pH: x  Gluc: 77 mg/dL / Ketone: x  / Bili: x / Urobili: x   Blood: x / Protein: x / Nitrite: x   Leuk Esterase: x / RBC: x / WBC x   Sq Epi: x / Non Sq Epi: x / Bacteria: x      CAPILLARY BLOOD GLUCOSE            Cultures      RADIOLOGY & ADDITIONAL TESTS:    Imaging Personally Reviewed:  [X ] YES  [ ] NO    Consultant(s) Notes Reviewed:  [ X] YES  [ ] NO    Care Discussed with Consultants/Other Providers [X ] YES  [ ] NO
Patient is a 89y old  Female who presents with a chief complaint of UTI , vaginal mass (13 Nov 2024 17:10)      INTERVAL HPI/OVERNIGHT EVENTS: INR therapeutic but going down, increase Coumadin to 4.5 mg . NA improving. Medically clear for discharge. I called daughter again to discuss, left a message.     Pain Location & Control:     MEDICATIONS  (STANDING):  DULoxetine 30 milliGRAM(s) Oral daily  levothyroxine 25 MICROGram(s) Oral daily  lisinopril 5 milliGRAM(s) Oral daily  melatonin 3 milliGRAM(s) Oral at bedtime  pantoprazole    Tablet 40 milliGRAM(s) Oral before breakfast  sodium chloride 1 Gram(s) Oral daily  warfarin 4.5 milliGRAM(s) Oral once    MEDICATIONS  (PRN):  senna 2 Tablet(s) Oral at bedtime PRN Constipation      Allergies    No Known Allergies    Intolerances        REVIEW OF SYSTEMS:  CONSTITUTIONAL: No fever, weight loss, or fatigue  EYES: No eye pain, visual disturbances, or discharge  ENMT:  No difficulty hearing, tinnitus, vertigo; No sinus or throat pain  NECK: No pain or stiffness  BREASTS: No pain, masses, or nipple discharge  RESPIRATORY: No cough, wheezing, chills or hemoptysis; No shortness of breath  CARDIOVASCULAR: No chest pain, palpitations, dizziness, or leg swelling  GASTROINTESTINAL: No abdominal or epigastric pain. No nausea, vomiting, or hematemesis; No diarrhea or constipation. No melena or hematochezia.  GENITOURINARY: No dysuria, frequency, hematuria, or incontinence  NEUROLOGICAL: No headaches, memory loss, loss of strength, numbness, or tremors  SKIN: No itching, burning, rashes, or lesions   LYMPH NODES: No enlarged glands  ENDOCRINE: No heat or cold intolerance; No hair loss; No polydipsia or polyuria  MUSCULOSKELETAL: No back pain  PSYCHIATRIC: No depression, anxiety, mood swings, or difficulty sleeping  HEME/LYMPH: No easy bruising, or bleeding gums  ALLERGY AND IMMUNOLOGIC: No hives or eczema    Vital Signs Last 24 Hrs  T(C): 36.4 (14 Nov 2024 20:08), Max: 36.4 (14 Nov 2024 05:22)  T(F): 97.5 (14 Nov 2024 20:08), Max: 97.6 (14 Nov 2024 05:22)  HR: 90 (14 Nov 2024 20:08) (70 - 90)  BP: 132/74 (14 Nov 2024 20:08) (132/74 - 139/87)  BP(mean): --  RR: 18 (14 Nov 2024 20:08) (18 - 18)  SpO2: 97% (14 Nov 2024 20:08) (97% - 98%)    Parameters below as of 14 Nov 2024 20:08  Patient On (Oxygen Delivery Method): room air        PHYSICAL EXAM:  GENERAL: NAD, well-groomed, well-developed  HEAD:  Atraumatic, Normocephalic  EYES: EOMI, PERRLA, conjunctiva and sclera clear  ENMT: No tonsillar erythema, exudates, or enlargement; Moist mucous membranes, Good dentition, No lesions  NECK: Supple, No JVD, Normal thyroid  NERVOUS SYSTEM:  Alert & Oriented X3, Good concentration; Motor Strength 5/5 B/L upper and lower extremities; DTRs 2+ intact and symmetric  CHEST/LUNG: Clear to auscultation bilaterally; No rales, rhonchi, wheezing, or rubs  HEART: Regular rate and rhythm; No murmurs, rubs, or gallops  ABDOMEN: Soft, Nontender, Nondistended; Bowel sounds present  EXTREMITIES:  2+ Peripheral Pulses, No clubbing or cyanosis  LYMPH: No lymphadenopathy noted  SKIN: No rashes or lesions       LABS:    14 Nov 2024 07:44    134    |  98     |  23     ----------------------------<  74     4.9     |  27     |  0.53     Ca    9.1        14 Nov 2024 07:44      PT/INR - ( 14 Nov 2024 07:46 )   PT: 22.7 sec;   INR: 2.00 ratio         PTT - ( 14 Nov 2024 07:46 )  PTT:38.6 sec  Urinalysis Basic - ( 14 Nov 2024 07:44 )    Color: x / Appearance: x / SG: x / pH: x  Gluc: 74 mg/dL / Ketone: x  / Bili: x / Urobili: x   Blood: x / Protein: x / Nitrite: x   Leuk Esterase: x / RBC: x / WBC x   Sq Epi: x / Non Sq Epi: x / Bacteria: x      CAPILLARY BLOOD GLUCOSE            Cultures      RADIOLOGY & ADDITIONAL TESTS:    Imaging Personally Reviewed:  [X ] YES  [ ] NO    Consultant(s) Notes Reviewed:  [ X] YES  [ ] NO    Care Discussed with Consultants/Other Providers [X ] YES  [ ] NO
Date of Service: 11-02-24 @ 17:03           CARDIOLOGY     PROGRESS  NOTE   ________________________________________________    CHIEF COMPLAINT:Patient is a 89y old  Female who presents with a chief complaint of UTI , vaginal mass (01 Nov 2024 20:32)  comfortable  	  REVIEW OF SYSTEMS:  CONSTITUTIONAL: No fever, weight loss, or fatigue  EYES: No eye pain, visual disturbances, or discharge  ENT:  No difficulty hearing, tinnitus, vertigo; No sinus or throat pain  NECK: No pain or stiffness  RESPIRATORY: No cough, wheezing, chills or hemoptysis; No Shortness of Breath  CARDIOVASCULAR: No chest pain, palpitations, passing out, dizziness, or leg swelling  GASTROINTESTINAL: No abdominal or epigastric pain. No nausea, vomiting, or hematemesis; No diarrhea or constipation. No melena or hematochezia.  GENITOURINARY: No dysuria, frequency, hematuria, or incontinence  NEUROLOGICAL: No headaches, memory loss, loss of strength, numbness, or tremors  SKIN: No itching, burning, rashes, or lesions   LYMPH Nodes: No enlarged glands  ENDOCRINE: No heat or cold intolerance; No hair loss  MUSCULOSKELETAL: No joint pain or swelling; No muscle, back, or extremity pain  PSYCHIATRIC: No depression, anxiety, mood swings, or difficulty sleeping  HEME/LYMPH: No easy bruising, or bleeding gums  ALLERGY AND IMMUNOLOGIC: No hives or eczema	    [ ] All others negative	  [x ] Unable to obtain    PHYSICAL EXAM:  T(C): 36.7 (11-02-24 @ 12:05), Max: 36.9 (11-01-24 @ 20:42)  HR: 89 (11-02-24 @ 12:05) (68 - 89)  BP: 186/80 (11-02-24 @ 12:05) (132/78 - 186/80)  RR: 18 (11-02-24 @ 08:44) (18 - 20)  SpO2: 97% (11-02-24 @ 08:44) (97% - 98%)  Wt(kg): --  I&O's Summary    01 Nov 2024 07:01  -  02 Nov 2024 07:00  --------------------------------------------------------  IN: 720 mL / OUT: 0 mL / NET: 720 mL    02 Nov 2024 08:01  -  02 Nov 2024 17:03  --------------------------------------------------------  IN: 600 mL / OUT: 0 mL / NET: 600 mL        Appearance: Normal	  HEENT:   Normal oral mucosa, PERRL, EOMI	  Lymphatic: No lymphadenopathy  Cardiovascular: Normal S1 S2, No JVD, + murmurs, No edema  Respiratory: rhonchi  Gastrointestinal:  Soft, Non-tender, + BS	  Skin: No rashes, No ecchymoses, No cyanosis	  Neurologic: Non-focal  Extremities: Normal range of motion, No clubbing, cyanosis or edema  Vascular: Peripheral pulses palpable 2+ bilaterally    MEDICATIONS  (STANDING):  cefTRIAXone   IVPB 1000 milliGRAM(s) IV Intermittent every 24 hours  DULoxetine 30 milliGRAM(s) Oral daily  levothyroxine 25 MICROGram(s) Oral daily  melatonin 3 milliGRAM(s) Oral at bedtime  pantoprazole    Tablet 40 milliGRAM(s) Oral before breakfast  warfarin 3 milliGRAM(s) Oral at bedtime      TELEMETRY: 	    ECG:  	  RADIOLOGY:  OTHER: 	  	  LABS:	 	    CARDIAC MARKERS:                                9.2    2.99  )-----------( 342      ( 02 Nov 2024 07:08 )             29.2     11-02    137  |  102  |  32[H]  ----------------------------<  79  3.7   |  25  |  0.55    Ca    8.2[L]      02 Nov 2024 07:08  Phos  3.4     11-02  Mg     2.0     11-02      proBNP:   Lipid Profile:   HgA1c:   TSH:   PT/INR - ( 02 Nov 2024 07:08 )   PT: 23.1 sec;   INR: 2.04 ratio            Assessment and plan  ---------------------------  89 yo F with PMH of  Advanced debility, adult FTT,  Muscle wasting and atrophy, chronic A-Fib, Severe protein-calorie malnutrition, Underweight (BMI < 19), HTN. HLD, Hypothyroidism, Anemia of poor PO intake, at risk of malnutrition, constipation, unsteady gait. who was found with a vaginal mass and some delusional thought which could be due to UTI was transferred to ER for further urology evaluation. She claimed in ER she was examined by a male nurse for this vaginal mass without chaperone however based on our investigation from [facility], the exam was done with patient's consent and chaperoned with one of female aid, all documented in facility notes. She was found with a vaginal mass and per her daughter request she was sent to hospital.     UC contaminated treat with 5 days IV ceftriaxone.   Vaginal mass - refused examination . F/urology.  Bladder wall thickening -possible malignancy with a lot of diverticulum and stone in bladder needs urological exam and workup but wants to discuss with daughter before proceeding with consult.   AFIB - Coumadin. Monitor PT/INR.  Protein calorie malnutrition - as above.   Hypothyroid - on Levothroid 25 mcg, daily, monitor TSH.   Depression - on Duloxetine  DVT ppx - Coumadin  inr is therapeutic  continue abx  will adjust bp meds    	        
Date of Service: 11-03-24 @ 06:59           CARDIOLOGY     PROGRESS  NOTE   ________________________________________________    CHIEF COMPLAINT:Patient is a 89y old  Female who presents with a chief complaint of UTI , vaginal mass (02 Nov 2024 17:03)  no complain, comfortable  	  REVIEW OF SYSTEMS:  CONSTITUTIONAL: No fever, weight loss, or fatigue  EYES: No eye pain, visual disturbances, or discharge  ENT:  No difficulty hearing, tinnitus, vertigo; No sinus or throat pain  NECK: No pain or stiffness  RESPIRATORY: No cough, wheezing, chills or hemoptysis; No Shortness of Breath  CARDIOVASCULAR: No chest pain, palpitations, passing out, dizziness, or leg swelling  GASTROINTESTINAL: No abdominal or epigastric pain. No nausea, vomiting, or hematemesis; No diarrhea or constipation. No melena or hematochezia.  GENITOURINARY: No dysuria, frequency, hematuria, or incontinence  NEUROLOGICAL: No headaches, memory loss, loss of strength, numbness, or tremors  SKIN: No itching, burning, rashes, or lesions   LYMPH Nodes: No enlarged glands  ENDOCRINE: No heat or cold intolerance; No hair loss  MUSCULOSKELETAL: No joint pain or swelling; No muscle, back, or extremity pain  PSYCHIATRIC: No depression, anxiety, mood swings, or difficulty sleeping  HEME/LYMPH: No easy bruising, or bleeding gums  ALLERGY AND IMMUNOLOGIC: No hives or eczema	    [ ] All others negative	  [x ] Unable to obtain    PHYSICAL EXAM:  T(C): 36.4 (11-03-24 @ 04:53), Max: 36.9 (11-02-24 @ 19:43)  HR: 83 (11-03-24 @ 04:53) (76 - 89)  BP: 160/90 (11-03-24 @ 04:53) (146/81 - 160/90)  RR: 18 (11-03-24 @ 04:53) (18 - 18)  SpO2: 97% (11-03-24 @ 04:53) (97% - 97%)  Wt(kg): --  I&O's Summary    01 Nov 2024 07:01  -  02 Nov 2024 07:00  --------------------------------------------------------  IN: 720 mL / OUT: 0 mL / NET: 720 mL    02 Nov 2024 08:01  -  03 Nov 2024 06:59  --------------------------------------------------------  IN: 960 mL / OUT: 0 mL / NET: 960 mL        Appearance: Normal	  HEENT:   Normal oral mucosa, PERRL, EOMI	  Lymphatic: No lymphadenopathy  Cardiovascular: Normal S1 S2, No JVD, + murmurs, No edema  Respiratory:rhonchi  Gastrointestinal:  Soft, Non-tender, + BS	  Skin: No rashes, No ecchymoses, No cyanosis	  Neurologic: Non-focal  Extremities: Normal range of motion, No clubbing, cyanosis or edema  Vascular: Peripheral pulses palpable 2+ bilaterally    MEDICATIONS  (STANDING):  DULoxetine 30 milliGRAM(s) Oral daily  levothyroxine 25 MICROGram(s) Oral daily  melatonin 3 milliGRAM(s) Oral at bedtime  pantoprazole    Tablet 40 milliGRAM(s) Oral before breakfast  warfarin 3 milliGRAM(s) Oral at bedtime      TELEMETRY: 	    ECG:  	  RADIOLOGY:  OTHER: 	  	  LABS:	 	    CARDIAC MARKERS:                            9.2    2.99  )-----------( 342      ( 02 Nov 2024 07:08 )             29.2     11-02    137  |  102  |  32[H]  ----------------------------<  79  3.7   |  25  |  0.55    Ca    8.2[L]      02 Nov 2024 07:08  Phos  3.4     11-02  Mg     2.0     11-02      proBNP:   Lipid Profile:   HgA1c:   TSH:   PT/INR - ( 02 Nov 2024 07:08 )   PT: 23.1 sec;   INR: 2.04 ratio               Assessment and plan  ---------------------------  89 yo F with PMH of  Advanced debility, adult FTT,  Muscle wasting and atrophy, chronic A-Fib, Severe protein-calorie malnutrition, Underweight (BMI < 19), HTN. HLD, Hypothyroidism, Anemia of poor PO intake, at risk of malnutrition, constipation, unsteady gait. who was found with a vaginal mass and some delusional thought which could be due to UTI was transferred to ER for further urology evaluation. She claimed in ER she was examined by a male nurse for this vaginal mass without chaperone however based on our investigation from [facility], the exam was done with patient's consent and chaperoned with one of female aid, all documented in facility notes. She was found with a vaginal mass and per her daughter request she was sent to hospital.     UC contaminated treat with 5 days IV ceftriaxone.   Vaginal mass - refused examination . F/urology.  Bladder wall thickening -possible malignancy with a lot of diverticulum and stone in bladder needs urological exam and workup but wants to discuss with daughter before proceeding with consult.   AFIB - Coumadin. Monitor PT/INR.  Protein calorie malnutrition - as above.   Hypothyroid - on Levothroid 25 mcg, daily, monitor TSH.   Depression - on Duloxetine  DVT ppx - Coumadin  inr is therapeutic  continue abx  will adjust bp meds/ check inr adjust coumadin dose  add lisinopril 5 mg daily for elevated bp    	        
Date of Service: 11-10-24 @ 08:56           CARDIOLOGY     PROGRESS  NOTE   ________________________________________________    CHIEF COMPLAINT:Patient is a 89y old  Female who presents with a chief complaint of UTI , vaginal mass (09 Nov 2024 12:48)  no complain  	  REVIEW OF SYSTEMS:  CONSTITUTIONAL: No fever, weight loss, or fatigue  EYES: No eye pain, visual disturbances, or discharge  ENT:  No difficulty hearing, tinnitus, vertigo; No sinus or throat pain  NECK: No pain or stiffness  RESPIRATORY: No cough, wheezing, chills or hemoptysis; No Shortness of Breath  CARDIOVASCULAR: No chest pain, palpitations, passing out, dizziness, or leg swelling  GASTROINTESTINAL: No abdominal or epigastric pain. No nausea, vomiting, or hematemesis; No diarrhea or constipation. No melena or hematochezia.  GENITOURINARY: No dysuria, frequency, hematuria, or incontinence  NEUROLOGICAL: No headaches, memory loss, loss of strength, numbness, or tremors  SKIN: No itching, burning, rashes, or lesions   LYMPH Nodes: No enlarged glands  ENDOCRINE: No heat or cold intolerance; No hair loss  MUSCULOSKELETAL: No joint pain or swelling; No muscle, back, or extremity pain  PSYCHIATRIC: No depression, anxiety, mood swings, or difficulty sleeping  HEME/LYMPH: No easy bruising, or bleeding gums  ALLERGY AND IMMUNOLOGIC: No hives or eczema	    [x ] All others negative	  [ ] Unable to obtain    PHYSICAL EXAM:  T(C): 36.6 (11-10-24 @ 04:10), Max: 36.9 (11-09-24 @ 09:14)  HR: 84 (11-10-24 @ 04:10) (74 - 84)  BP: 153/88 (11-10-24 @ 04:10) (120/82 - 159/83)  RR: 19 (11-10-24 @ 04:10) (18 - 19)  SpO2: 95% (11-10-24 @ 04:10) (95% - 96%)  Wt(kg): --  I&O's Summary    09 Nov 2024 07:01  -  10 Nov 2024 07:00  --------------------------------------------------------  IN: 660 mL / OUT: 500 mL / NET: 160 mL        Appearance: Normal	  HEENT:   Normal oral mucosa, PERRL, EOMI	  Lymphatic: No lymphadenopathy  Cardiovascular: Normal S1 S2, No JVD, + murmurs, No edema  Respiratory: rhonchi  Gastrointestinal:  Soft, Non-tender, + BS	  Skin: No rashes, No ecchymoses, No cyanosis	  Extremities: Normal range of motion, No clubbing, cyanosis or edema  Vascular: Peripheral pulses palpable 2+ bilaterally    MEDICATIONS  (STANDING):  DULoxetine 30 milliGRAM(s) Oral daily  levothyroxine 25 MICROGram(s) Oral daily  lisinopril 5 milliGRAM(s) Oral daily  melatonin 3 milliGRAM(s) Oral at bedtime  pantoprazole    Tablet 40 milliGRAM(s) Oral before breakfast      TELEMETRY: 	    ECG:  	  RADIOLOGY:  OTHER: 	  	  LABS:	 	    CARDIAC MARKERS:    proBNP:   Lipid Profile:   HgA1c:   TSH: Thyroid Stimulating Hormone, Serum: 3.23 uIU/mL (11-06 @ 07:19)    PT/INR - ( 10 Nov 2024 07:10 )   PT: 28.8 sec;   INR: 2.55 ratio        Assessment and plan  ---------------------------  88 yo F with PMH of  Advanced debility, adult FTT,  Muscle wasting and atrophy, chronic A-Fib, Severe protein-calorie malnutrition, Underweight (BMI < 19), HTN. HLD, Hypothyroidism, Anemia of poor PO intake, at risk of malnutrition, constipation, unsteady gait. who was found with a vaginal mass and some delusional thought which could be due to UTI was transferred to ER for further urology evaluation. She claimed in ER she was examined by a male nurse for this vaginal mass without chaperone however based on our investigation from [facility], the exam was done with patient's consent and chaperoned with one of female aid, all documented in facility notes. She was found with a vaginal mass and per her daughter request she was sent to hospital.   UC contaminated treat with 5 days IV ceftriaxone.   Vaginal mass -Daughter agreed for urology consult. F/u urology.  Bladder wall thickening -possible malignancy with a lot of diverticulum and stone in bladder. Evaluated by urology who recommended f/u office as outpatient.   AFIB - Coumadin. Monitor PT/INR. INR therapeutic. Continue Coumadin 4 mg. Off of Lovenox.   Protein calorie malnutrition - as above.   Hypothyroid - on Levothroid 25 mcg, daily, monitor TSH.   Depression - on Duloxetine  DVT ppx - Coumadin  discharge plan- f/u  for placement.   doing well, no complain, fu [facility] as out pt  increase lisinopril to 10 mg daily if bp remains elevated  dc planning in progress  fu inr, daily coumadin dose  	        
Patient is a 89y old  Female who presents with a chief complaint of UTI , vaginal mass (03 Nov 2024 06:59)      INTERVAL HPI/OVERNIGHT EVENTS: Patient still not oriented to her medical issues. Did not talk to daughter. I tried to call daughter twice, no answer. Medically stable. Complete course of IV abx  for ? UTI. Recommended palliative team  to get in touch with family  and also discharge plan to rehab. Patient not willing for any urology workup on this admission. Patient refused to be examined as well.  This visit done with chaperon  NP on 3 [facility].      Pain Location & Control:     MEDICATIONS  (STANDING):  DULoxetine 30 milliGRAM(s) Oral daily  levothyroxine 25 MICROGram(s) Oral daily  lisinopril 5 milliGRAM(s) Oral daily  melatonin 3 milliGRAM(s) Oral at bedtime  pantoprazole    Tablet 40 milliGRAM(s) Oral before breakfast  warfarin 3 milliGRAM(s) Oral at bedtime    MEDICATIONS  (PRN):  oxyCODONE    IR 2.5 milliGRAM(s) Oral every 6 hours PRN Severe Pain (7 - 10)      Allergies    No Known Allergies    Intolerances        REVIEW OF SYSTEMS:  CONSTITUTIONAL: No fever, weight loss, or fatigue  EYES: No eye pain, visual disturbances, or discharge  ENMT:  No difficulty hearing, tinnitus, vertigo; No sinus or throat pain  NECK: No pain or stiffness  BREASTS: No pain, masses, or nipple discharge  RESPIRATORY: No cough, wheezing, chills or hemoptysis; No shortness of breath  CARDIOVASCULAR: No chest pain, palpitations, dizziness, or leg swelling  GASTROINTESTINAL: No abdominal or epigastric pain. No nausea, vomiting, or hematemesis; No diarrhea or constipation. No melena or hematochezia.  GENITOURINARY: No dysuria, frequency, hematuria, or incontinence  NEUROLOGICAL: No headaches, memory loss, loss of strength, numbness, or tremors  SKIN: No itching, burning, rashes, or lesions   LYMPH NODES: No enlarged glands  ENDOCRINE: No heat or cold intolerance; No hair loss; No polydipsia or polyuria  MUSCULOSKELETAL: No back pain  PSYCHIATRIC: No depression, anxiety, mood swings, or difficulty sleeping  HEME/LYMPH: No easy bruising, or bleeding gums  ALLERGY AND IMMUNOLOGIC: No hives or eczema    Vital Signs Last 24 Hrs  T(C): 36.6 (04 Nov 2024 20:13), Max: 36.7 (04 Nov 2024 04:37)  T(F): 97.8 (04 Nov 2024 20:13), Max: 98.1 (04 Nov 2024 04:37)  HR: 76 (04 Nov 2024 20:13) (71 - 79)  BP: 124/82 (04 Nov 2024 20:13) (115/72 - 148/83)  BP(mean): --  RR: 18 (04 Nov 2024 20:13) (18 - 18)  SpO2: 96% (04 Nov 2024 20:13) (96% - 97%)    Parameters below as of 04 Nov 2024 20:13  Patient On (Oxygen Delivery Method): room air        PHYSICAL EXAM:  GENERAL: NAD, well-groomed, well-developed  HEAD:  Atraumatic, Normocephalic  EYES: EOMI, PERRLA, conjunctiva and sclera clear  ENMT: No tonsillar erythema, exudates, or enlargement; Moist mucous membranes, Good dentition, No lesions  NECK: Supple, No JVD, Normal thyroid  NERVOUS SYSTEM:  Alert & Oriented X 2   CHEST/LUNG: Clear to auscultation bilaterally; No rales, rhonchi, wheezing, or rubs  HEART: Regular rate and rhythm; No murmurs, rubs, or gallops  ABDOMEN: Soft, Nontender, Nondistended; Bowel sounds present  EXTREMITIES:  2+ Peripheral Pulses, No clubbing or cyanosis  LYMPH: No lymphadenopathy noted  SKIN: No rashes or lesions      LABS:      Ca    8.7        03 Nov 2024 07:15      PT/INR - ( 04 Nov 2024 07:13 )   PT: 20.3 sec;   INR: 1.79 ratio           Urinalysis Basic - ( 03 Nov 2024 07:15 )    Color: x / Appearance: x / SG: x / pH: x  Gluc: 68 mg/dL / Ketone: x  / Bili: x / Urobili: x   Blood: x / Protein: x / Nitrite: x   Leuk Esterase: x / RBC: x / WBC x   Sq Epi: x / Non Sq Epi: x / Bacteria: x      CAPILLARY BLOOD GLUCOSE            Cultures  Culture Results:   >=3 organisms. Probable collection contamination. (10-30-24 @ 23:01)      RADIOLOGY & ADDITIONAL TESTS:    Imaging Personally Reviewed:  [ X] YES  [ ] NO    Consultant(s) Notes Reviewed:  [ X] YES  [ ] NO    Care Discussed with Consultants/Other Providers [X ] YES  [ ] NO
Patient is a 89y old  Female who presents with a chief complaint of UTI , vaginal mass (05 Nov 2024 18:11)      INTERVAL HPI/OVERNIGHT EVENTS: F/u on patient and her bladder wall thickening for which I spoke with daughter who agreed  with urology consult. Consult urology, waiting  for full consult evaluation. Appetite is better takes at least breakfast meal a day and daughter comes in to give her dinner. She is not FTT anymore, eating properly. Hydration stable. BUN mildly elevated up to 32. I recommended IV fluid NS 1L. INR subtherapeutic 1.7, increase Coumadin up to 4 mg. Monitor PT/INR closely.     Pain Location & Control:     MEDICATIONS  (STANDING):  DULoxetine 30 milliGRAM(s) Oral daily  levothyroxine 25 MICROGram(s) Oral daily  lisinopril 5 milliGRAM(s) Oral daily  melatonin 3 milliGRAM(s) Oral at bedtime  pantoprazole    Tablet 40 milliGRAM(s) Oral before breakfast  sodium chloride 0.9%. 1000 milliLiter(s) (50 mL/Hr) IV Continuous <Continuous>  warfarin 4 milliGRAM(s) Oral once    MEDICATIONS  (PRN):  oxyCODONE    IR 2.5 milliGRAM(s) Oral every 6 hours PRN Severe Pain (7 - 10)      Allergies    No Known Allergies    Intolerances        REVIEW OF SYSTEMS:  CONSTITUTIONAL: No fever, weight loss, or fatigue  EYES: No eye pain, visual disturbances, or discharge  ENMT:  No difficulty hearing, tinnitus, vertigo; No sinus or throat pain  NECK: No pain or stiffness  BREASTS: No pain, masses, or nipple discharge  RESPIRATORY: No cough, wheezing, chills or hemoptysis; No shortness of breath  CARDIOVASCULAR: No chest pain, palpitations, dizziness, or leg swelling  GASTROINTESTINAL: No abdominal or epigastric pain. No nausea, vomiting, or hematemesis; No diarrhea or constipation. No melena or hematochezia.  GENITOURINARY: No dysuria, frequency, hematuria, or incontinence  NEUROLOGICAL: No headaches, memory loss, loss of strength, numbness, or tremors  SKIN: No itching, burning, rashes, or lesions   LYMPH NODES: No enlarged glands  ENDOCRINE: No heat or cold intolerance; No hair loss; No polydipsia or polyuria  MUSCULOSKELETAL: No back pain  PSYCHIATRIC: No depression, anxiety, mood swings, or difficulty sleeping  HEME/LYMPH: No easy bruising, or bleeding gums  ALLERGY AND IMMUNOLOGIC: No hives or eczema    Vital Signs Last 24 Hrs  T(C): 36.7 (05 Nov 2024 16:10), Max: 36.7 (05 Nov 2024 16:10)  T(F): 98.1 (05 Nov 2024 16:10), Max: 98.1 (05 Nov 2024 16:10)  HR: 78 (05 Nov 2024 16:10) (73 - 93)  BP: 101/66 (05 Nov 2024 16:10) (101/66 - 145/90)  BP(mean): --  RR: 20 (05 Nov 2024 16:10) (18 - 20)  SpO2: 98% (05 Nov 2024 16:10) (95% - 98%)    Parameters below as of 05 Nov 2024 16:10  Patient On (Oxygen Delivery Method): room air        PHYSICAL EXAM:  GENERAL: NAD, well-groomed, well-developed  HEAD:  Atraumatic, Normocephalic  EYES: EOMI, PERRLA, conjunctiva and sclera clear  ENMT: No tonsillar erythema, exudates, or enlargement; Moist mucous membranes, Good dentition, No lesions  NECK: Supple, No JVD, Normal thyroid  NERVOUS SYSTEM:  Alert & Oriented X3, Good concentration; Motor Strength 5/5 B/L upper and lower extremities; DTRs 2+ intact and symmetric  CHEST/LUNG: Clear to auscultation bilaterally; No rales, rhonchi, wheezing, or rubs  HEART: Regular rate and rhythm; No murmurs, rubs, or gallops  ABDOMEN: Soft, Nontender, Nondistended; Bowel sounds present  EXTREMITIES:  2+ Peripheral Pulses, No clubbing or cyanosis  LYMPH: No lymphadenopathy noted  SKIN: No rashes or lesions      LABS:          PT/INR - ( 05 Nov 2024 07:27 )   PT: 19.5 sec;   INR: 1.72 ratio         PTT - ( 05 Nov 2024 07:27 )  PTT:34.8 sec    CAPILLARY BLOOD GLUCOSE            Cultures  Culture Results:   >=3 organisms. Probable collection contamination. (10-30-24 @ 23:01)      RADIOLOGY & ADDITIONAL TESTS:    Imaging Personally Reviewed:  [X ] YES  [ ] NO    Consultant(s) Notes Reviewed:  [ X] YES  [ ] NO    Care Discussed with Consultants/Other Providers [ X] YES  [ ] NO
Patient is a 89y old  Female who presents with a chief complaint of Per chart, Pt is an 88 y/o F with PMH: "Advanced debility, adult FTT,  Muscle wasting and atrophy, chronic A-Fib, Severe protein-calorie malnutrition, Underweight (BMI < 19), HTN. HLD, Hypothyroidism, Anemia of poor PO intake, at risk of malnutrition, constipation, unsteady gait. who was found with a vaginal mass and some delusional thought which could be due to UTI was transferred to ER for further urology evaluation." On IV Abx for UTI. (01 Nov 2024 10:59)      INTERVAL HPI/OVERNIGHT EVENTS: Medically stable. UA positive. U C&S contaminated. Will treat with Ceftriaxone for 5 days. Mildly confused but awake and responsive. I tried to reach daughter who did not answer the phone. Consulted urology for this abnormal finding in bladder with  diverticula in bladder and  stone and wall thickening. R/o bladder malignancy.     Pain Location & Control:     MEDICATIONS  (STANDING):  cefTRIAXone   IVPB 1000 milliGRAM(s) IV Intermittent every 24 hours  DULoxetine 30 milliGRAM(s) Oral daily  levothyroxine 25 MICROGram(s) Oral daily  melatonin 3 milliGRAM(s) Oral at bedtime  pantoprazole    Tablet 40 milliGRAM(s) Oral before breakfast  warfarin 3 milliGRAM(s) Oral at bedtime    MEDICATIONS  (PRN):  oxyCODONE    IR 2.5 milliGRAM(s) Oral every 6 hours PRN Severe Pain (7 - 10)      Allergies    No Known Allergies    Intolerances        REVIEW OF SYSTEMS:  CONSTITUTIONAL: No fever, weight loss, or fatigue  EYES: No eye pain, visual disturbances, or discharge  ENMT:  No difficulty hearing, tinnitus, vertigo; No sinus or throat pain  NECK: No pain or stiffness  BREASTS: No pain, masses, or nipple discharge  RESPIRATORY: No cough, wheezing, chills or hemoptysis; No shortness of breath  CARDIOVASCULAR: No chest pain, palpitations, dizziness, or leg swelling  GASTROINTESTINAL: No abdominal or epigastric pain. No nausea, vomiting, or hematemesis; No diarrhea or constipation. No melena or hematochezia.  GENITOURINARY: No dysuria, frequency, hematuria, or incontinence  NEUROLOGICAL: No headaches, memory loss, loss of strength, numbness, or tremors  SKIN: No itching, burning, rashes, or lesions   LYMPH NODES: No enlarged glands  ENDOCRINE: No heat or cold intolerance; No hair loss; No polydipsia or polyuria  MUSCULOSKELETAL: No back pain  PSYCHIATRIC: No depression, anxiety, mood swings, or difficulty sleeping  HEME/LYMPH: No easy bruising, or bleeding gums  ALLERGY AND IMMUNOLOGIC: No hives or eczema    Vital Signs Last 24 Hrs  T(C): 37 (01 Nov 2024 16:24), Max: 37.1 (01 Nov 2024 04:53)  T(F): 98.6 (01 Nov 2024 16:24), Max: 98.8 (01 Nov 2024 04:53)  HR: 86 (01 Nov 2024 16:24) (83 - 86)  BP: 117/72 (01 Nov 2024 16:24) (105/56 - 132/74)  BP(mean): --  RR: 18 (01 Nov 2024 16:24) (18 - 19)  SpO2: 96% (01 Nov 2024 16:24) (96% - 96%)    Parameters below as of 01 Nov 2024 16:24  Patient On (Oxygen Delivery Method): room air        PHYSICAL EXAM:  GENERAL: NAD, well-groomed, well-developed  HEAD:  Atraumatic, Normocephalic  NECK: Supple, No JVD, Normal thyroid  NERVOUS SYSTEM:  Alert & Oriented X3, Good concentration; Motor Strength 5/5 B/L upper and lower extremities; DTRs 2+ intact and symmetric  CHEST/LUNG: Clear to auscultation bilaterally; No rales, rhonchi, wheezing, or rubs  HEART: Regular rate and rhythm; No murmurs, rubs, or gallops  ABDOMEN: Soft, Nontender, Nondistended; Bowel sounds present  EXTREMITIES:  2+ Peripheral Pulses, No clubbing or cyanosis      LABS:                        10.2   3.46  )-----------( 378      ( 01 Nov 2024 07:10 )             32.0     01 Nov 2024 07:07    137    |  103    |  25     ----------------------------<  79     3.6     |  22     |  0.55     Ca    8.4        01 Nov 2024 07:07      PT/INR - ( 01 Nov 2024 07:08 )   PT: 19.6 sec;   INR: 1.71 ratio           Urinalysis Basic - ( 01 Nov 2024 07:07 )    Color: x / Appearance: x / SG: x / pH: x  Gluc: 79 mg/dL / Ketone: x  / Bili: x / Urobili: x   Blood: x / Protein: x / Nitrite: x   Leuk Esterase: x / RBC: x / WBC x   Sq Epi: x / Non Sq Epi: x / Bacteria: x      CAPILLARY BLOOD GLUCOSE            Cultures  Culture Results:   >=3 organisms. Probable collection contamination. (10-30-24 @ 23:01)      RADIOLOGY & ADDITIONAL TESTS:    Imaging Personally Reviewed:  [X] YES  [ ] NO    Consultant(s) Notes Reviewed:  [ X] YES  [ ] NO    Care Discussed with Consultants/Other Providers [X ] YES  [ ] NO
Patient is a 89y old  Female who presents with a chief complaint of UTI , vaginal mass (05 Nov 2024 18:41)      INTERVAL HPI/OVERNIGHT EVENTS: I visited patient with chaperone female nurse on 3 [facility] floor. Medically stable. Evaluated by urologist who did not recommended  any intervention at this time. Recommended to f/u in office as outpatient.     Pain Location & Control:     MEDICATIONS  (STANDING):  DULoxetine 30 milliGRAM(s) Oral daily  levothyroxine 25 MICROGram(s) Oral daily  lisinopril 5 milliGRAM(s) Oral daily  melatonin 3 milliGRAM(s) Oral at bedtime  pantoprazole    Tablet 40 milliGRAM(s) Oral before breakfast  sodium chloride 0.9%. 1000 milliLiter(s) (50 mL/Hr) IV Continuous <Continuous>  warfarin 4 milliGRAM(s) Oral once    MEDICATIONS  (PRN):  oxyCODONE    IR 2.5 milliGRAM(s) Oral every 6 hours PRN Severe Pain (7 - 10)      Allergies    No Known Allergies    Intolerances        REVIEW OF SYSTEMS:  CONSTITUTIONAL: No fever, weight loss, or fatigue  EYES: No eye pain, visual disturbances, or discharge  ENMT:  No difficulty hearing, tinnitus, vertigo; No sinus or throat pain  NECK: No pain or stiffness  BREASTS: No pain, masses, or nipple discharge  RESPIRATORY: No cough, wheezing, chills or hemoptysis; No shortness of breath  CARDIOVASCULAR: No chest pain, palpitations, dizziness, or leg swelling  GASTROINTESTINAL: No abdominal or epigastric pain. No nausea, vomiting, or hematemesis; No diarrhea or constipation. No melena or hematochezia.  GENITOURINARY: No dysuria, frequency, hematuria, or incontinence  NEUROLOGICAL: No headaches, memory loss, loss of strength, numbness, or tremors  SKIN: No itching, burning, rashes, or lesions   LYMPH NODES: No enlarged glands  ENDOCRINE: No heat or cold intolerance; No hair loss; No polydipsia or polyuria  MUSCULOSKELETAL: No back pain  PSYCHIATRIC: No depression, anxiety, mood swings, or difficulty sleeping  HEME/LYMPH: No easy bruising, or bleeding gums  ALLERGY AND IMMUNOLOGIC: No hives or eczema    Vital Signs Last 24 Hrs  T(C): 37.1 (06 Nov 2024 16:24), Max: 37.1 (06 Nov 2024 16:24)  T(F): 98.7 (06 Nov 2024 16:24), Max: 98.7 (06 Nov 2024 16:24)  HR: 89 (06 Nov 2024 16:24) (71 - 101)  BP: 110/73 (06 Nov 2024 16:24) (110/73 - 154/81)  BP(mean): --  RR: 17 (06 Nov 2024 16:24) (16 - 18)  SpO2: 95% (06 Nov 2024 16:24) (95% - 97%)    Parameters below as of 06 Nov 2024 16:24  Patient On (Oxygen Delivery Method): room air        PHYSICAL EXAM: Patient non-compliant with  exam  NERVOUS SYSTEM:  Alert & Oriented X 2   CHEST/LUNG: Clear to auscultation bilaterally; No rales, rhonchi, wheezing, or rubs  HEART: Regular rate and rhythm; No murmurs, rubs, or gallops          LABS:                        9.6    3.28  )-----------( 316      ( 06 Nov 2024 07:18 )             30.1     06 Nov 2024 07:19    131    |  99     |  23     ----------------------------<  70     4.4     |  23     |  0.46     Ca    8.4        06 Nov 2024 07:19      PT/INR - ( 06 Nov 2024 07:18 )   PT: 20.8 sec;   INR: 1.84 ratio         PTT - ( 06 Nov 2024 07:18 )  PTT:33.8 sec  Urinalysis Basic - ( 06 Nov 2024 07:19 )    Color: x / Appearance: x / SG: x / pH: x  Gluc: 70 mg/dL / Ketone: x  / Bili: x / Urobili: x   Blood: x / Protein: x / Nitrite: x   Leuk Esterase: x / RBC: x / WBC x   Sq Epi: x / Non Sq Epi: x / Bacteria: x      CAPILLARY BLOOD GLUCOSE            Cultures  Culture Results:   >=3 organisms. Probable collection contamination. (10-30-24 @ 23:01)      RADIOLOGY & ADDITIONAL TESTS:    Imaging Personally Reviewed:  [X ] YES  [ ] NO    Consultant(s) Notes Reviewed:  [ X] YES  [ ] NO    Care Discussed with Consultants/Other Providers [X ] YES  [ ] NO
Patient is a 89y old  Female who presents with a chief complaint of UTI , vaginal mass (11 Nov 2024 09:53)      INTERVAL HPI/OVERNIGHT EVENTS: Medically clear for discharge. INR therapeutic. Patient can be discharged anytime. F/u      Pain Location & Control:     MEDICATIONS  (STANDING):  DULoxetine 30 milliGRAM(s) Oral daily  levothyroxine 25 MICROGram(s) Oral daily  lisinopril 5 milliGRAM(s) Oral daily  melatonin 3 milliGRAM(s) Oral at bedtime  pantoprazole    Tablet 40 milliGRAM(s) Oral before breakfast    MEDICATIONS  (PRN):  polyethylene glycol 3350 17 Gram(s) Oral daily PRN Constipation  senna 2 Tablet(s) Oral at bedtime PRN Constipation      Allergies    No Known Allergies    Intolerances        REVIEW OF SYSTEMS:  CONSTITUTIONAL: No fever, weight loss, or fatigue  EYES: No eye pain, visual disturbances, or discharge  ENMT:  No difficulty hearing, tinnitus, vertigo; No sinus or throat pain  NECK: No pain or stiffness  BREASTS: No pain, masses, or nipple discharge  RESPIRATORY: No cough, wheezing, chills or hemoptysis; No shortness of breath  CARDIOVASCULAR: No chest pain, palpitations, dizziness, or leg swelling  GASTROINTESTINAL: No abdominal or epigastric pain. No nausea, vomiting, or hematemesis; No diarrhea or constipation. No melena or hematochezia.  GENITOURINARY: No dysuria, frequency, hematuria, or incontinence  NEUROLOGICAL: No headaches, memory loss, loss of strength, numbness, or tremors  SKIN: No itching, burning, rashes, or lesions   LYMPH NODES: No enlarged glands  ENDOCRINE: No heat or cold intolerance; No hair loss; No polydipsia or polyuria  MUSCULOSKELETAL: No back pain  PSYCHIATRIC: No depression, anxiety, mood swings, or difficulty sleeping  HEME/LYMPH: No easy bruising, or bleeding gums  ALLERGY AND IMMUNOLOGIC: No hives or eczema    Vital Signs Last 24 Hrs  T(C): 37.2 (11 Nov 2024 11:37), Max: 37.7 (10 Nov 2024 20:24)  T(F): 98.9 (11 Nov 2024 11:37), Max: 99.8 (10 Nov 2024 20:24)  HR: 81 (11 Nov 2024 11:37) (74 - 85)  BP: 124/82 (11 Nov 2024 11:37) (118/76 - 149/87)  BP(mean): --  RR: 18 (11 Nov 2024 11:37) (18 - 18)  SpO2: 96% (11 Nov 2024 11:37) (95% - 97%)    Parameters below as of 11 Nov 2024 11:37  Patient On (Oxygen Delivery Method): room air        PHYSICAL EXAM:  GENERAL: NAD, well-groomed, well-developed  HEAD:  Atraumatic, Normocephalic  EYES: EOMI, PERRLA, conjunctiva and sclera clear  ENMT: No tonsillar erythema, exudates, or enlargement; Moist mucous membranes, Good dentition, No lesions  NECK: Supple, No JVD, Normal thyroid  NERVOUS SYSTEM:  Alert & Oriented X 2  CHEST/LUNG: Clear to auscultation bilaterally; No rales, rhonchi, wheezing, or rubs  HEART: Regular rate and rhythm; No murmurs, rubs, or gallops  ABDOMEN: Soft, Nontender, Nondistended; Bowel sounds present  EXTREMITIES:  2+ Peripheral Pulses, No clubbing or cyanosis  LYMPH: No lymphadenopathy noted  SKIN: No rashes or lesions      LABS:          PT/INR - ( 11 Nov 2024 07:07 )   PT: 31.8 sec;   INR: 2.79 ratio             CAPILLARY BLOOD GLUCOSE            Cultures      RADIOLOGY & ADDITIONAL TESTS:    Imaging Personally Reviewed:  [X ] YES  [ ] NO    Consultant(s) Notes Reviewed:  [ X] YES  [ ] NO    Care Discussed with Consultants/Other Providers [ X] YES  [ ] NO
Patient is a 89y old  Female who presents with a chief complaint of UTI , vaginal mass (12 Nov 2024 10:07)      INTERVAL HPI/OVERNIGHT EVENTS: Medically stable. INR therapeutic. Continue Coumadin 4 mg.  I completed form for assisted living as requested by  will leave in chart tomorrow. Otherwise stable.     Pain Location & Control:     MEDICATIONS  (STANDING):  DULoxetine 30 milliGRAM(s) Oral daily  levothyroxine 25 MICROGram(s) Oral daily  lisinopril 5 milliGRAM(s) Oral daily  melatonin 3 milliGRAM(s) Oral at bedtime  pantoprazole    Tablet 40 milliGRAM(s) Oral before breakfast  warfarin 4 milliGRAM(s) Oral once    MEDICATIONS  (PRN):  senna 2 Tablet(s) Oral at bedtime PRN Constipation      Allergies    No Known Allergies    Intolerances        REVIEW OF SYSTEMS:  CONSTITUTIONAL: No fever, weight loss, or fatigue  EYES: No eye pain, visual disturbances, or discharge  ENMT:  No difficulty hearing, tinnitus, vertigo; No sinus or throat pain  NECK: No pain or stiffness  BREASTS: No pain, masses, or nipple discharge  RESPIRATORY: No cough, wheezing, chills or hemoptysis; No shortness of breath  CARDIOVASCULAR: No chest pain, palpitations, dizziness, or leg swelling  GASTROINTESTINAL: No abdominal or epigastric pain. No nausea, vomiting, or hematemesis; No diarrhea or constipation. No melena or hematochezia.  GENITOURINARY: No dysuria, frequency, hematuria, or incontinence  NEUROLOGICAL: No headaches, memory loss, loss of strength, numbness, or tremors  SKIN: No itching, burning, rashes, or lesions   LYMPH NODES: No enlarged glands  ENDOCRINE: No heat or cold intolerance; No hair loss; No polydipsia or polyuria  MUSCULOSKELETAL: No back pain  PSYCHIATRIC: No depression, anxiety, mood swings, or difficulty sleeping  HEME/LYMPH: No easy bruising, or bleeding gums  ALLERGY AND IMMUNOLOGIC: No hives or eczema    Vital Signs Last 24 Hrs  T(C): 36.7 (12 Nov 2024 19:41), Max: 36.7 (12 Nov 2024 09:13)  T(F): 98.1 (12 Nov 2024 19:41), Max: 98.1 (12 Nov 2024 19:41)  HR: 84 (12 Nov 2024 19:41) (73 - 84)  BP: 113/70 (12 Nov 2024 19:41) (113/70 - 133/79)  BP(mean): --  RR: 18 (12 Nov 2024 19:41) (18 - 18)  SpO2: 97% (12 Nov 2024 19:41) (97% - 98%)    Parameters below as of 12 Nov 2024 19:41  Patient On (Oxygen Delivery Method): room air        PHYSICAL EXAM:  GENERAL: NAD, well-groomed, well-developed  HEAD:  Atraumatic, Normocephalic  EYES: EOMI, PERRLA, conjunctiva and sclera clear  ENMT: No tonsillar erythema, exudates, or enlargement; Moist mucous membranes, Good dentition, No lesions  NECK: Supple, No JVD, Normal thyroid  NERVOUS SYSTEM:  Alert & Oriented X3, Good concentration; Motor Strength 5/5 B/L upper and lower extremities; DTRs 2+ intact and symmetric  CHEST/LUNG: Clear to auscultation bilaterally; No rales, rhonchi, wheezing, or rubs  HEART: Regular rate and rhythm; No murmurs, rubs, or gallops  ABDOMEN: Soft, Nontender, Nondistended; Bowel sounds present  EXTREMITIES:  2+ Peripheral Pulses, No clubbing or cyanosis  LYMPH: No lymphadenopathy noted  SKIN: No rashes or lesions      LABS:          PT/INR - ( 12 Nov 2024 07:11 )   PT: 29.4 sec;   INR: 2.58 ratio             CAPILLARY BLOOD GLUCOSE            Cultures      RADIOLOGY & ADDITIONAL TESTS:    Imaging Personally Reviewed:  [ X] YES  [ ] NO    Consultant(s) Notes Reviewed:  [ X] YES  [ ] NO    Care Discussed with Consultants/Other Providers [ X] YES  [ ] NO
No new complaints  Appetite is good    REVIEW OF SYSTEMS:    RESPIRATORY: No cough, wheezing, hemoptysis; No shortness of breath  CARDIOVASCULAR: No chest pain or palpitations  GASTROINTESTINAL: No abdominal, nausea, vomiting, or hematemesis; No diarrhea or constipation. No melena or hematochezia.  All other review of systems is negative unless indicated above.    VITAL SIGNS:    T98.9    PHYSICAL EXAM:     GENERAL: no acute distress  HEENT: NC/AT, EOMI, neck supple, MMM  RESPIRATORY: LCTAB/L, no rhonchi, rales, or wheezing  CARDIOVASCULAR: RRR, no murmurs, gallops, rubs  ABDOMINAL: soft, non-tender, non-distended, positive bowel sounds   EXTREMITIES: no clubbing, cyanosis, or edema              MEDICATIONS  (STANDING):  DULoxetine 30 milliGRAM(s) Oral daily  levothyroxine 25 MICROGram(s) Oral daily  lisinopril 5 milliGRAM(s) Oral daily  melatonin 3 milliGRAM(s) Oral at bedtime  pantoprazole    Tablet 40 milliGRAM(s) Oral before breakfast      
Feels OK  On coumadin    REVIEW OF SYSTEMS:    CONSTITUTIONAL: No weakness, fevers or chills, weight loss  EYES/ENT: No visual changes, no throat pain   RESPIRATORY: No cough, wheezing, hemoptysis; No shortness of breath  CARDIOVASCULAR: No chest pain or palpitations  GASTROINTESTINAL: No abdominal, nausea, vomiting, or hematemesis; No diarrhea or constipation. No melena or hematochezia.  GENITOURINARY: No dysuria, frequency or hematuria    VITAL SIGNS:    T97.7    PHYSICAL EXAM:     GENERAL: no acute distress  HEENT: NC/AT, EOMI, neck supple, MMM  RESPIRATORY: LCTAB/L, no rhonchi, rales, or wheezing  CARDIOVASCULAR: RRR, no murmurs, gallops, rubs  ABDOMINAL: soft, non-tender, non-distended, positive bowel sounds   EXTREMITIES: no clubbing, cyanosis, or edema              MEDICATIONS  (STANDING):  DULoxetine 30 milliGRAM(s) Oral daily  levothyroxine 25 MICROGram(s) Oral daily  lisinopril 5 milliGRAM(s) Oral daily  melatonin 3 milliGRAM(s) Oral at bedtime  pantoprazole    Tablet 40 milliGRAM(s) Oral before breakfast      
No new complaints.  No petechiae & ecchymosis    REVIEW OF SYSTEMS:    RESPIRATORY: No cough, wheezing, hemoptysis; No shortness of breath  CARDIOVASCULAR: No chest pain or palpitations  GASTROINTESTINAL: No abdominal, nausea, vomiting, or hematemesis; No diarrhea or constipation. No melena or hematochezia.      VITAL SIGNS:    T98    PHYSICAL EXAM:     GENERAL: no acute distress  HEENT: NC/AT, EOMI, neck supple, MMM  RESPIRATORY: LCTAB/L, no rhonchi, rales, or wheezing  CARDIOVASCULAR: RRR, no murmurs, gallops, rubs  ABDOMINAL: soft, non-tender, non-distended, positive bowel sounds   EXTREMITIES: no clubbing, cyanosis, or edema  SKIN: no rashes or lesions   MUSCULOSKELETAL: no gross joint deformity              MEDICATIONS  (STANDING):  DULoxetine 30 milliGRAM(s) Oral daily  levothyroxine 25 MICROGram(s) Oral daily  lisinopril 5 milliGRAM(s) Oral daily  melatonin 3 milliGRAM(s) Oral at bedtime  pantoprazole    Tablet 40 milliGRAM(s) Oral before breakfast  warfarin 5 milliGRAM(s) Oral once      
No new complaints  Eating well  No clnical bleeding    REVIEW OF SYSTEMS:    CONSTITUTIONAL: No weakness, fevers or chills, weight loss  EYES/ENT: No visual changes, no throat pain   RESPIRATORY: No cough, wheezing, hemoptysis; No shortness of breath  CARDIOVASCULAR: No chest pain or palpitations  GASTROINTESTINAL: No abdominal, nausea, vomiting, or hematemesis; No diarrhea or constipation. No melena or hematochezia.  GENITOURINARY: No dysuria, frequency or hematuria  All other review of systems is negative unless indicated above.    VITAL SIGNS:    T97.5    PHYSICAL EXAM:     GENERAL: no acute distress  HEENT: NC/AT, EOMI, neck supple, MMM  RESPIRATORY: LCTAB/L, no rhonchi, rales, or wheezing  CARDIOVASCULAR: RRR, no murmurs, gallops, rubs  ABDOMINAL: soft, non-tender, non-distended, positive bowel sounds   EXTREMITIES: no clubbing, cyanosis, or edema  NEUROLOGICAL: alert and oriented x 3, non-focal  LYMPHATIC: lymphatic: cervical, supraclavicular, axilla, inguinal  SKIN: no rashes or lesions   MUSCULOSKELETAL: no gross joint deformity                          10.0   2.28  )-----------( 286      ( 13 Nov 2024 07:11 )             30.7     11-13    131[L]  |  97  |  22  ----------------------------<  77  4.3   |  23  |  0.47[L]    Ca    8.9      13 Nov 2024 06:58        MEDICATIONS  (STANDING):  DULoxetine 30 milliGRAM(s) Oral daily  levothyroxine 25 MICROGram(s) Oral daily  lisinopril 5 milliGRAM(s) Oral daily  melatonin 3 milliGRAM(s) Oral at bedtime  pantoprazole    Tablet 40 milliGRAM(s) Oral before breakfast  sodium chloride 1 Gram(s) Oral daily  warfarin 4 milliGRAM(s) Oral once

## 2024-11-15 NOTE — PROGRESS NOTE ADULT - PROBLEM SELECTOR PLAN 1
continue present medication  continue coumadin
continue coumadin  check fecal blood
continue present medication  continue coumadin
continue present medication  continue coumadin
continue coumadin
monitor INR  continue coumadin 4 mg po

## 2025-02-05 NOTE — DIETITIAN INITIAL EVALUATION ADULT - NUTRITION CONSULT
yes Add Option For Additional Mediation: No Ndc# (Optional): 05496-276-34 Consent: The risks of atrophy were reviewed with the patient. Expiration Date (Optional): 12/2025 Lot # (Optional): [id number] Kenalog Preparation: kenalog Concentration (Mg/Ml): 40.0 Total Volume (Ccs): 1 Concentration (Mg/Ml) Of Additional Medication: 2.5 Detail Level: None Administered By (Optional): KALEB Mancilla, RN